# Patient Record
Sex: FEMALE | Race: OTHER | NOT HISPANIC OR LATINO | Employment: UNEMPLOYED | URBAN - METROPOLITAN AREA
[De-identification: names, ages, dates, MRNs, and addresses within clinical notes are randomized per-mention and may not be internally consistent; named-entity substitution may affect disease eponyms.]

---

## 2019-09-12 ENCOUNTER — APPOINTMENT (OUTPATIENT)
Dept: PHYSICAL THERAPY | Facility: CLINIC | Age: 64
End: 2019-09-12
Payer: COMMERCIAL

## 2019-09-26 ENCOUNTER — EVALUATION (OUTPATIENT)
Dept: PHYSICAL THERAPY | Facility: CLINIC | Age: 64
End: 2019-09-26
Payer: COMMERCIAL

## 2019-09-26 ENCOUNTER — TRANSCRIBE ORDERS (OUTPATIENT)
Dept: PHYSICAL THERAPY | Facility: CLINIC | Age: 64
End: 2019-09-26

## 2019-09-26 DIAGNOSIS — M17.12 PRIMARY OSTEOARTHRITIS OF LEFT KNEE: Primary | ICD-10-CM

## 2019-09-26 DIAGNOSIS — Z96.652 HISTORY OF TOTAL LEFT KNEE REPLACEMENT: ICD-10-CM

## 2019-09-26 DIAGNOSIS — M17.12 OSTEOARTHRITIS OF LEFT KNEE, UNSPECIFIED OSTEOARTHRITIS TYPE: Primary | ICD-10-CM

## 2019-09-26 PROCEDURE — 97110 THERAPEUTIC EXERCISES: CPT

## 2019-09-26 PROCEDURE — 97161 PT EVAL LOW COMPLEX 20 MIN: CPT

## 2019-09-26 RX ORDER — OXYCODONE HYDROCHLORIDE 15 MG/1
15 TABLET ORAL EVERY 4 HOURS PRN
COMMUNITY
End: 2021-09-22

## 2019-09-26 RX ORDER — TRAMADOL HYDROCHLORIDE 50 MG/1
50 TABLET ORAL EVERY 6 HOURS PRN
COMMUNITY
End: 2021-09-22

## 2019-09-26 RX ORDER — MELOXICAM 15 MG/1
15 TABLET ORAL DAILY
COMMUNITY
End: 2021-09-22

## 2019-09-26 NOTE — PROGRESS NOTES
PT Evaluation     Today's date: 2019  Patient name: Denise Buerger  : 1955  MRN: [id number]  Referring provider: Daisha Deras MD  Dx:   Encounter Diagnosis     ICD-10-CM    1  Primary osteoarthritis of left knee M17 12    2  History of total left knee replacement Z96 [id number]        Start Time: 832  Stop Time: 09  Total time in clinic (min): 30 minutes    Assessment  Assessment details: Denise Buerger is a 60 y o  female presenting to PT with pain, decreased knee range of motion, decreased LE strength, balance deficits, and decreased tolerance to activity s/p L TKA one month ago  Patient would benefit from skilled PT services to address these impairments and to maximize function in order to improve quality of life  Thank you for the referral   Impairments: abnormal gait, abnormal muscle tone, abnormal or restricted ROM, activity intolerance, impaired balance, impaired physical strength, lacks appropriate home exercise program, pain with function, safety issue and weight-bearing intolerance  Functional limitations: pain with ambulation and ascending/descending stairs, unable to participate in recreational activities due to pain, difficulty completing household chores and daily activities  Symptom irritability: moderateUnderstanding of Dx/Px/POC: good   Prognosis: good    Goals  STG  1) L knee ROM will improve to at least 100 degrees in 4 weeks  2) L knee strength will improve 1/2 grade in 4 weeks  3) B/L hip strength will improve 1/2 grade in 4 weeks  LTG  1) Patient is independent in HEP  2) Patient will ascend/descend one flight of stairs independently with no increased pain in 8 weeks  3) Patient will ambulate independently and safely with no AD for 200 feet on level surface in 8 weeks  4) L knee ROM will be at least 115 degrees in 8 weeks      Plan  Patient would benefit from: skilled physical therapy  Planned modality interventions: cryotherapy  Planned therapy interventions: joint mobilization, manual therapy, muscle pump exercises, neuromuscular re-education, patient education, strengthening, stretching, therapeutic activities, therapeutic exercise, home exercise program, gait training, flexibility, balance/weight bearing training, massage, abdominal trunk stabilization, body mechanics training, postural training and functional ROM exercises  Frequency: 2x week  Duration in weeks: 8  Treatment plan discussed with: patient        Subjective Evaluation    History of Present Illness  Date of surgery: 2019  Mechanism of injury: Patient presents 30 days after L TKA  She was having home PT until 6 days ago  She is ambulating with a SPC which she has been using for about 2 weeks, using a RW for 2 weeks before that  She tells me she was doing well until she got a cold last week, laid up in bed for a day and unable to do her exercises  She says since that day she feels like she took a step back with her recovery  She says her flexion range was up to 86 degrees "on a really good day "  Quality of life: good    Pain  Current pain rating: 3  At best pain ratin  At worst pain ratin  Quality: tight, dull ache and burning  Relieving factors: ice, change in position and medications  Aggravating factors: standing and walking    Treatments  Previous treatment: physical therapy  Current treatment: physical therapy  Discharged from (in last 30 days): inpatient hospitalization and home health care  Patient Goals  Patient goals for therapy: increased strength, return to sport/leisure activities, independence with ADLs/IADLs, increased motion, decreased pain, decreased edema and return to work  Patient goal: improve range of motion and get back to walking        Objective     Observations   Left Knee   Positive for adhesive scar, edema and incision  Negative for drainage  Tenderness   Left Knee   Tenderness in the inferior patella, lateral joint line and medial joint line       Neurological Testing     Sensation     Knee   Left Knee   Intact: light touch    Passive Range of Motion   Left Knee   Flexion: 80 degrees with pain  Extension: -8 degrees with pain    Mobility   Patellar Mobility:   Left Knee   Hypomobile: left medial, left lateral, left superior and left inferior    Strength/Myotome Testing     Left Hip   Planes of Motion   Flexion: 3+  Extension: 3+  Abduction: 3+  Adduction: 4-    Isolated Muscles   Gluteus coty: 3+  Gluteus medius: 3+    Left Knee   Flexion: 3+  Extension: 3  Quadriceps contraction: fair    Swelling     Left Knee Girth Measurement (cm)   Joint line: 46 5 cm    FOTO score: 35  Expected at discharge: 59          Precautions: none    Daily Treatment Diary     Manuals             L knee PROM flex/extension             Edema massage LLE (swelling)                                       Exercise Diary              Bike             Leg press flexion stretch                          Heel slides             SAQ             HS/calf strap stretch                          Glute bridge             SLR flexion                                       LAQ             TB HS curls                          Mini squats             Standing 3-way hip             Standing march                                                                                           Modalities             CP post prn

## 2019-09-26 NOTE — LETTER
2019    Ian Mills MD  [address] [place] [address]    Patient: Diane Andres   YOB: 1955   Date of Visit: 2019     Encounter Diagnosis     ICD-10-CM    1  Primary osteoarthritis of left knee M17 12    2  History of total left knee replacement [id number]        Dear Dr Shelly Whittington:    Thank you for your recent referral of Diane Andres  Please review the attached evaluation summary from Chana's recent visit  Please verify that you agree with the plan of care by signing the attached order  If you have any questions or concerns, please do not hesitate to call  I sincerely appreciate the opportunity to share in the care of one of your patients and hope to have another opportunity to work with you in the near future  Sincerely,    Romeo Barnhart, PT      Referring Provider:      I certify that I have read the below Plan of Care and certify the need for these services furnished under this plan of treatment while under my care  Ian Mills MD  [facility]: [phone]          PT Evaluation     Today's date: 2019  Patient name: Diane Andres  : 1955  MRN: [id number]  Referring provider: Radhames Ortiz MD  Dx:   Encounter Diagnosis     ICD-10-CM    1  Primary osteoarthritis of left knee M17 12    2  History of total left knee replacement Z96 [id number]        Start Time: 832  Stop Time: 902  Total time in clinic (min): 30 minutes    Assessment  Assessment details: Diane Andres is a 60 y o  female presenting to PT with pain, decreased knee range of motion, decreased LE strength, balance deficits, and decreased tolerance to activity s/p L TKA one month ago  Patient would benefit from skilled PT services to address these impairments and to maximize function in order to improve quality of life   Thank you for the referral   Impairments: abnormal gait, abnormal muscle tone, abnormal or restricted ROM, activity intolerance, impaired balance, impaired physical strength, lacks appropriate home exercise program, pain with function, safety issue and weight-bearing intolerance  Functional limitations: pain with ambulation and ascending/descending stairs, unable to participate in recreational activities due to pain, difficulty completing household chores and daily activities  Symptom irritability: moderateUnderstanding of Dx/Px/POC: good   Prognosis: good    Goals  STG  1) L knee ROM will improve to at least 100 degrees in 4 weeks  2) L knee strength will improve 1/2 grade in 4 weeks  3) B/L hip strength will improve 1/2 grade in 4 weeks  LTG  1) Patient is independent in HEP  2) Patient will ascend/descend one flight of stairs independently with no increased pain in 8 weeks  3) Patient will ambulate independently and safely with no AD for 200 feet on level surface in 8 weeks  4) L knee ROM will be at least 115 degrees in 8 weeks  Plan  Patient would benefit from: skilled physical therapy  Planned modality interventions: cryotherapy  Planned therapy interventions: joint mobilization, manual therapy, muscle pump exercises, neuromuscular re-education, patient education, strengthening, stretching, therapeutic activities, therapeutic exercise, home exercise program, gait training, flexibility, balance/weight bearing training, massage, abdominal trunk stabilization, body mechanics training, postural training and functional ROM exercises  Frequency: 2x week  Duration in weeks: 8  Treatment plan discussed with: patient        Subjective Evaluation    History of Present Illness  Date of surgery: 8/27/2019  Mechanism of injury: Patient presents 30 days after L TKA  She was having home PT until 6 days ago  She is ambulating with a SPC which she has been using for about 2 weeks, using a RW for 2 weeks before that    She tells me she was doing well until she got a cold last week, laid up in bed for a day and unable to do her exercises  She says since that day she feels like she took a step back with her recovery  She says her flexion range was up to 86 degrees "on a really good day "  Quality of life: good    Pain  Current pain rating: 3  At best pain ratin  At worst pain ratin  Quality: tight, dull ache and burning  Relieving factors: ice, change in position and medications  Aggravating factors: standing and walking    Treatments  Previous treatment: physical therapy  Current treatment: physical therapy  Discharged from (in last 30 days): inpatient hospitalization and home health care  Patient Goals  Patient goals for therapy: increased strength, return to sport/leisure activities, independence with ADLs/IADLs, increased motion, decreased pain, decreased edema and return to work  Patient goal: improve range of motion and get back to walking        Objective     Observations   Left Knee   Positive for adhesive scar, edema and incision  Negative for drainage  Tenderness   Left Knee   Tenderness in the inferior patella, lateral joint line and medial joint line       Neurological Testing     Sensation     Knee   Left Knee   Intact: light touch    Passive Range of Motion   Left Knee   Flexion: 80 degrees with pain  Extension: -8 degrees with pain    Mobility   Patellar Mobility:   Left Knee   Hypomobile: left medial, left lateral, left superior and left inferior    Strength/Myotome Testing     Left Hip   Planes of Motion   Flexion: 3+  Extension: 3+  Abduction: 3+  Adduction: 4-    Isolated Muscles   Gluteus coty: 3+  Gluteus medius: 3+    Left Knee   Flexion: 3+  Extension: 3  Quadriceps contraction: fair    Swelling     Left Knee Girth Measurement (cm)   Joint line: 46 5 cm    FOTO score: 35  Expected at discharge: 59          Precautions: none    Daily Treatment Diary     Manuals             L knee PROM flex/extension             Edema massage LLE (swelling) Exercise Diary              Bike             Leg press flexion stretch                          Heel slides             SAQ             HS/calf strap stretch                          Glute bridge             SLR flexion                                       LAQ             TB HS curls                          Mini squats             Standing 3-way hip             Standing march                                                                                           Modalities             CP post prn

## 2019-09-30 ENCOUNTER — OFFICE VISIT (OUTPATIENT)
Dept: PHYSICAL THERAPY | Facility: CLINIC | Age: 64
End: 2019-09-30
Payer: COMMERCIAL

## 2019-09-30 DIAGNOSIS — Z96.652 HISTORY OF TOTAL LEFT KNEE REPLACEMENT: ICD-10-CM

## 2019-09-30 DIAGNOSIS — M17.12 PRIMARY OSTEOARTHRITIS OF LEFT KNEE: Primary | ICD-10-CM

## 2019-09-30 PROCEDURE — 97140 MANUAL THERAPY 1/> REGIONS: CPT

## 2019-09-30 PROCEDURE — 97110 THERAPEUTIC EXERCISES: CPT

## 2019-09-30 PROCEDURE — 97112 NEUROMUSCULAR REEDUCATION: CPT

## 2019-09-30 NOTE — PROGRESS NOTES
Daily Note     Today's date: 2019  Patient name: Jose L Aviles  : 1955  MRN: [id number]  Referring provider: Emir Lopez MD  Dx:   Encounter Diagnosis     ICD-10-CM    1  Primary osteoarthritis of left knee M17 12    2  History of total left knee replacement [id number] [id number]        Start Time: 804  Stop Time: 0900  Total time in clinic (min): 56 minutes    Subjective: Patient tells me her knee feels tight this morning  She says when she woke up two days ago her knee felt "so great", describing improved range and no tightness, but as the day went on it felt like it was getting tighter  Objective: See treatment diary below  Assessment: Tolerated treatment well  Patient with significantly limited L knee flexion ROM, which affects her ability to perform some exercises (bridges in particular)  She is able to tolerate manual stretching into knee flexion, but does have increased discomfort with this  Patient demonstrated fatigue post treatment, exhibited good technique with therapeutic exercises and would benefit from continued PT      Plan: Continue per plan of care          Precautions: none    Daily Treatment Diary     Manuals             L knee PROM flex/extension AN            Edema massage LLE (swelling) AN                                      Exercise Diary              Bike 6 min timer            Leg press flexion stretch 10 sec  x10                         Heel slides 5 sec  2x10            SAQ 3 sec  2x10            HS/calf strap stretch 30 sec  x3                         Glute bridge NV            SLR flexion NV                                      LAQ NV            TB HS curls NV                         Mini squats 2x10            Standing 3-way hip 2x10 ea            Standing march 2x10                                                                                          Modalities             CP post prn 10 min

## 2019-10-03 ENCOUNTER — OFFICE VISIT (OUTPATIENT)
Dept: PHYSICAL THERAPY | Facility: CLINIC | Age: 64
End: 2019-10-03
Payer: COMMERCIAL

## 2019-10-03 DIAGNOSIS — Z96.652 HISTORY OF TOTAL LEFT KNEE REPLACEMENT: ICD-10-CM

## 2019-10-03 DIAGNOSIS — M17.12 PRIMARY OSTEOARTHRITIS OF LEFT KNEE: Primary | ICD-10-CM

## 2019-10-03 PROCEDURE — 97110 THERAPEUTIC EXERCISES: CPT

## 2019-10-03 PROCEDURE — 97140 MANUAL THERAPY 1/> REGIONS: CPT

## 2019-10-03 PROCEDURE — 97112 NEUROMUSCULAR REEDUCATION: CPT

## 2019-10-03 NOTE — PROGRESS NOTES
Daily Note     Today's date: 10/3/2019  Patient name: Sharda Lombardo  : 1955  MRN: [id number]  Referring provider: Nicol Bartholomew MD  Dx:   Encounter Diagnosis     ICD-10-CM    1  Primary osteoarthritis of left knee M17 12    2  History of total left knee replacement [id number] [id number]                   Subjective: patient noted that in the morning when she wakes up she feels great and then throughout the day tightness and increased swelling  Patient noted that right now she is taking Asprin and noted that the doctor said to stop Asprin on Oct 8 and then she can start taking anti inflammatories  Objective: See treatment diary below      Assessment: Tolerated treatment fair  Added  Ham curls with pink TB with no patient complains  Patient needed some VC to correct form to perform standing hip exercises with out compensation  Patient would benefit from continued PT  Plan: Continue per plan of care        Precautions: none    Daily Treatment Diary     Manuals  10/3           L knee PROM flex/extension AN af           Edema massage LLE (swelling) AN af                                     Exercise Diary              Bike 6 min timer 6 min timer           Leg press flexion stretch 10 sec  x10 10 secx10                        Heel slides 5 sec  2x10 5 sec  2x10           SAQ 3 sec  2x10 3 sec  2x10           HS/calf strap stretch 30 sec  x3 30 sec  x3                        Glute bridge NV            SLR flexion NV                                      LAQ NV            TB HS curls NV  pink TB 10x                        Mini squats 2x10 2x10           Standing 3-way hip 2x10 ea 2x10 ea           Standing march 2x10 2x10                                                                                         Modalities             CP post prn 10 min

## 2019-10-07 ENCOUNTER — OFFICE VISIT (OUTPATIENT)
Dept: PHYSICAL THERAPY | Facility: CLINIC | Age: 64
End: 2019-10-07
Payer: COMMERCIAL

## 2019-10-07 DIAGNOSIS — M17.12 PRIMARY OSTEOARTHRITIS OF LEFT KNEE: Primary | ICD-10-CM

## 2019-10-07 DIAGNOSIS — Z96.652 HISTORY OF TOTAL LEFT KNEE REPLACEMENT: ICD-10-CM

## 2019-10-07 PROCEDURE — 97140 MANUAL THERAPY 1/> REGIONS: CPT | Performed by: PHYSICAL THERAPIST

## 2019-10-07 PROCEDURE — 97110 THERAPEUTIC EXERCISES: CPT | Performed by: PHYSICAL THERAPIST

## 2019-10-07 PROCEDURE — 97112 NEUROMUSCULAR REEDUCATION: CPT | Performed by: PHYSICAL THERAPIST

## 2019-10-07 NOTE — PROGRESS NOTES
Daily Note     Today's date: 10/7/2019  Patient name: Darlin Speaker  : 1955  MRN: [id number]  Referring provider: Solange Hanson MD  Dx:   Encounter Diagnosis     ICD-10-CM    1  Primary osteoarthritis of left knee M17 12    2  History of total left knee replacement Z96 [id number]                 Pt 1 on 1 from 800 to 855    Subjective: States that yesterday had a lot of low back pain which has improved today, but still present  Her knee feels stiff today  Objective: See treatment diary below      Assessment: Tolerated session fair  Knee ROM improved following bike and munual stretching, but does have considerable pain and discomfort  Able to achieve 90 degrees of knee flexion today following ROM exercises  Patient would benefit from continued PT  Plan: Continue per plan of care        Precautions: none    Daily Treatment Diary     Manuals 9/30 10/3 10/7          L knee PROM flex/extension AN af DD          Edema massage LLE (swelling) AN af DD                                    Exercise Diary              Bike 6 min timer 6 min timer 6min          Leg press flexion stretch 10 sec  x10 10 secx10 10 secx10                       Heel slides 5 sec  2x10 5 sec  2x10 5 sec 2x10          SAQ 3 sec  2x10 3 sec  2x10 5 sec 2x10          HS/calf strap stretch 30 sec  x3 30 sec  x3 30 sec  x3                       Glute bridge NV            SLR flexion NV                                      LAQ NV            TB HS curls NV  pink TB 10x pink TB 10x2                       Mini squats 2x10 2x10 2x10          Standing 3-way hip 2x10 ea 2x10 ea 2x10 ea          Standing march 2x10 2x10 2x10                                                                                        Modalities             CP post prn 10 min

## 2019-10-10 ENCOUNTER — OFFICE VISIT (OUTPATIENT)
Dept: PHYSICAL THERAPY | Facility: CLINIC | Age: 64
End: 2019-10-10
Payer: COMMERCIAL

## 2019-10-10 DIAGNOSIS — Z96.652 HISTORY OF TOTAL LEFT KNEE REPLACEMENT: ICD-10-CM

## 2019-10-10 DIAGNOSIS — M17.12 PRIMARY OSTEOARTHRITIS OF LEFT KNEE: Primary | ICD-10-CM

## 2019-10-10 PROCEDURE — 97112 NEUROMUSCULAR REEDUCATION: CPT

## 2019-10-10 PROCEDURE — 97140 MANUAL THERAPY 1/> REGIONS: CPT

## 2019-10-10 PROCEDURE — 97110 THERAPEUTIC EXERCISES: CPT

## 2019-10-10 NOTE — PROGRESS NOTES
Daily Note     Today's date: 10/10/2019  Patient name: Sharda Lombardo  : 1955  MRN: [id number]  Referring provider: Nicol Bartholomew MD  Dx:   Encounter Diagnosis     ICD-10-CM    1  Primary osteoarthritis of left knee M17 12    2  History of total left knee replacement [id number] [id number]        Start Time: 1145  Stop Time: 0839  Total time in clinic (min): 61 minutes    Subjective: Patient states her knee feels stiff this morning, but says this is normally the case  Objective: See treatment diary below  Progressed volume of strengthening exercises today  Assessment: Tolerated treatment well  Good performance of progressed strengthening exercises, with no reported pain or discomfort throughout session  Patient would benefit from continued PT in an effort to improve range of motion and LE strength  Plan: Continue per plan of care        Precautions: none    Daily Treatment Diary     Manuals 9/30 10/3 10/7 10/10         L knee PROM flex/extension AN af DD AN         Edema massage LLE (swelling) AN af DD AN                                   Exercise Diary              Bike 6 min timer 6 min timer 6min 8 min  timer         Leg press flexion stretch 10 sec  x10 10 sec  x10 10 sec  x10 10 sec  x10                      Heel slides 5 sec  2x10 5 sec  2x10 5 sec 2x10 5 sec  2x10         SAQ 3 sec  2x10 3 sec  2x10 5 sec 2x10 HEP         HS/calf strap stretch 30 sec  x3 30 sec  x3 30 sec  x3 30 sec  x3                      Glute bridge NV   10x         SLR flexion NV   NP                                   LAQ NV            TB HS curls NV  pink TB 10x pink TB 10x2 PTB x10                      Mini squats 2x10 2x10 2x10 2x10         Standing 3-way hip 2x10 ea 2x10 ea 2x10 ea 2# 2x10         Standing march 2x10 2x10 2x10 2# 2x10                                                                                       Modalities             CP post prn 10 min   10 min

## 2019-10-14 ENCOUNTER — OFFICE VISIT (OUTPATIENT)
Dept: PHYSICAL THERAPY | Facility: CLINIC | Age: 64
End: 2019-10-14
Payer: COMMERCIAL

## 2019-10-14 DIAGNOSIS — M17.12 PRIMARY OSTEOARTHRITIS OF LEFT KNEE: Primary | ICD-10-CM

## 2019-10-14 DIAGNOSIS — Z96.652 HISTORY OF TOTAL LEFT KNEE REPLACEMENT: ICD-10-CM

## 2019-10-14 PROCEDURE — 97110 THERAPEUTIC EXERCISES: CPT

## 2019-10-14 PROCEDURE — 97140 MANUAL THERAPY 1/> REGIONS: CPT

## 2019-10-14 PROCEDURE — 97112 NEUROMUSCULAR REEDUCATION: CPT

## 2019-10-14 NOTE — PROGRESS NOTES
Daily Note     Today's date: 10/14/2019  Patient name: Jocelyn Fish  : 1955  MRN: [id number]  Referring provider: Nikita Shipman MD  Dx:   Encounter Diagnosis     ICD-10-CM    1  Primary osteoarthritis of left knee M17 12    2  History of total left knee replacement [id number] [id number]        Start Time: 805  Stop Time: 901  Total time in clinic (min): 56 minutes    Subjective: Patient reports stiffness in L knee this morning, stating it usually loosens up after doing PT exercises  Objective: See treatment diary below  Knee flexion measured at 90 degrees this visit  Assessment: Patient with fair tolerance to manual stretching today, reporting increased tightness in lateral and anterior knee at end of available range of motion  Good performance of strengthening program today, maintaining volume since previous visit  Patient would benefit from continued PT in an effort to improve range of motion and LE strength  Plan: Continue per plan of care        Precautions: none    Daily Treatment Diary     Manuals 9/30 10/3 10/7 10/10 10/14        L knee PROM flex/extension AN af DD AN AN        Edema massage LLE (swelling) AN af DD AN AN                                  Exercise Diary              Bike 6 min timer 6 min timer 6min 8 min  timer 8 min timer        Leg press flexion stretch 10 sec  x10 10 sec  x10 10 sec  x10 10 sec  x10 10 sec  x10                     Heel slides 5 sec  2x10 5 sec  2x10 5 sec 2x10 5 sec  2x10 5 sec  2x10        SAQ 3 sec  2x10 3 sec  2x10 5 sec 2x10 HEP         HS/calf strap stretch 30 sec  x3 30 sec  x3 30 sec  x3 30 sec  x3 30 sec  x3                     Glute bridge NV   10x 10x        SLR flexion NV   NP NP                                  LAQ NV            TB HS curls NV  pink TB 10x pink TB 10x2 PTB x10 PTB 2x10                     Mini squats 2x10 2x10 2x10 2x10 2x10        Standing 3-way hip 2x10 ea 2x10 ea 2x10 ea 2# 2x10 2# 2x10        Standing march 2x10 2x10 2x10 2# 2x10 2# 2x10                                                                                      Modalities             CP post prn 10 min   10 min 10 min

## 2019-10-17 ENCOUNTER — OFFICE VISIT (OUTPATIENT)
Dept: PHYSICAL THERAPY | Facility: CLINIC | Age: 64
End: 2019-10-17
Payer: COMMERCIAL

## 2019-10-17 DIAGNOSIS — M17.12 PRIMARY OSTEOARTHRITIS OF LEFT KNEE: Primary | ICD-10-CM

## 2019-10-17 DIAGNOSIS — Z96.652 HISTORY OF TOTAL LEFT KNEE REPLACEMENT: ICD-10-CM

## 2019-10-17 PROCEDURE — 97140 MANUAL THERAPY 1/> REGIONS: CPT

## 2019-10-17 PROCEDURE — 97112 NEUROMUSCULAR REEDUCATION: CPT

## 2019-10-17 PROCEDURE — 97110 THERAPEUTIC EXERCISES: CPT

## 2019-10-17 NOTE — PROGRESS NOTES
Daily Note     Today's date: 10/17/2019  Patient name: Alessandro Austin  : 1955  MRN: [id number]  Referring provider: Linda Steve MD  Dx:   Encounter Diagnosis     ICD-10-CM    1  Primary osteoarthritis of left knee M17 12    2  History of total left knee replacement [id number] [id number]        Start Time: 9780  Stop Time: 0842  Total time in clinic (min): 68 minutes    Subjective: Patient tells me she has her usual stiffness this morning, since she hasn't yet stretched or done any exercise  Objective: See treatment diary below  Knee flexion measured to 95 degrees this visit  Assessment: Patient with fair tolerance to manual stretching today, reporting increased tightness in lateral and anterior knee at end of available range of motion  Patient with good performance of all TE today  Patient would benefit from continued PT in an effort to improve range of motion and LE strength  Plan: Continue per plan of care           Precautions: none    Daily Treatment Diary     Manuals 9/30 10/3 10/7 10/10 10/14 10/17       L knee PROM flex/extension AN af DD AN AN AN       Edema massage LLE (swelling) AN af DD AN AN AN                                 Exercise Diary              Bike 6 min timer 6 min timer 6min 8 min  timer 8 min timer 10 min  timer       Leg press flexion stretch 10 sec  x10 10 sec  x10 10 sec  x10 10 sec  x10 10 sec  x10 10 sec  x10                    Heel slides 5 sec  2x10 5 sec  2x10 5 sec 2x10 5 sec  2x10 5 sec  2x10 5 sec  2x10       SAQ 3 sec  2x10 3 sec  2x10 5 sec 2x10 HEP         HS/calf strap stretch 30 sec  x3 30 sec  x3 30 sec  x3 30 sec  x3 30 sec  x3 30 sec  x3                    Glute bridge NV   10x 10x 2x10       SLR flexion NV   NP NP NV                                 LAQ NV     2x10       TB HS curls NV  pink TB 10x pink TB 10x2 PTB x10 PTB 2x10 PTB 2x10                    Mini squats 2x10 2x10 2x10 2x10 2x10 3x10       Standing 3-way hip 2x10 ea 2x10 ea 2x10 ea 2# 2x10 2# 2x10 2# 2x10       Standing march 2x10 2x10 2x10 2# 2x10 2# 2x10 2# 2x10                                                                                     Modalities             CP post prn 10 min   10 min 10 min 10 min

## 2019-10-21 ENCOUNTER — OFFICE VISIT (OUTPATIENT)
Dept: PHYSICAL THERAPY | Facility: CLINIC | Age: 64
End: 2019-10-21
Payer: COMMERCIAL

## 2019-10-21 DIAGNOSIS — M17.12 PRIMARY OSTEOARTHRITIS OF LEFT KNEE: Primary | ICD-10-CM

## 2019-10-21 DIAGNOSIS — Z96.652 HISTORY OF TOTAL LEFT KNEE REPLACEMENT: ICD-10-CM

## 2019-10-21 PROCEDURE — 97110 THERAPEUTIC EXERCISES: CPT

## 2019-10-21 PROCEDURE — 97140 MANUAL THERAPY 1/> REGIONS: CPT

## 2019-10-21 PROCEDURE — 97112 NEUROMUSCULAR REEDUCATION: CPT

## 2019-10-21 NOTE — PROGRESS NOTES
Daily Note     Today's date: 10/21/2019  Patient name: Gianluca Wilson  : 1955  MRN: [id number]  Referring provider: Nicolas Kiran MD  Dx:   Encounter Diagnosis     ICD-10-CM    1  Primary osteoarthritis of left knee M17 12    2  History of total left knee replacement Z96 652        Start Time: 08  Stop Time: 911  Total time in clinic (min): 64 minutes    Subjective: Patient with no new reports upon arrival today  Objective: See treatment diary below  Knee flexion measured at 96 degrees today  Assessment: Patient tolerates treatment fair today  Good tolerance to manual stretching, reporting her knee "loosens up" as session moves along  Patient would benefit from continued PT in an effort to improve range of motion and LE strength  Plan: Continue per plan of care           Precautions: none    Daily Treatment Diary     Manuals 9/30 10/3 10/7 10/10 10/14 10/17 10/21      L knee PROM flex/extension AN af DD AN AN AN AN      Edema massage LLE (swelling) AN af DD AN AN AN AN                                Exercise Diary              Bike 6 min timer 6 min timer 6min 8 min  timer 8 min timer 10 min  timer 10 min  timer      Leg press flexion stretch 10 sec  x10 10 sec  x10 10 sec  x10 10 sec  x10 10 sec  x10 10 sec  x10 10 sec  x10                   Heel slides 5 sec  2x10 5 sec  2x10 5 sec 2x10 5 sec  2x10 5 sec  2x10 5 sec  2x10 5 sec  2x10      SAQ 3 sec  2x10 3 sec  2x10 5 sec 2x10 HEP         HS/calf strap stretch 30 sec  x3 30 sec  x3 30 sec  x3 30 sec  x3 30 sec  x3 30 sec  x3 30 sec  x3                   Glute bridge NV   10x 10x 2x10 2x10      SLR flexion NV   NP NP NV 2x10                                LAQ NV     2x10  2x10      TB HS curls NV  pink TB 10x pink TB 10x2 PTB x10 PTB 2x10 PTB 2x10 PTB 2x15                   Mini squats 2x10 2x10 2x10 2x10 2x10 3x10 3x10      Standing 3-way hip 2x10 ea 2x10 ea 2x10 ea 2# 2x10 2# 2x10 2# 2x10 2# 2x15      Standing march 2x10 2x10 2x10 2# 2x10 2# 2x10 2# 2x10 2# 2x15                                                                                    Modalities             CP post prn 10 min   10 min 10 min 10 min 10 min

## 2019-10-24 ENCOUNTER — OFFICE VISIT (OUTPATIENT)
Dept: PHYSICAL THERAPY | Facility: CLINIC | Age: 64
End: 2019-10-24
Payer: COMMERCIAL

## 2019-10-24 DIAGNOSIS — Z96.652 HISTORY OF TOTAL LEFT KNEE REPLACEMENT: ICD-10-CM

## 2019-10-24 DIAGNOSIS — M17.12 PRIMARY OSTEOARTHRITIS OF LEFT KNEE: Primary | ICD-10-CM

## 2019-10-24 PROCEDURE — 97112 NEUROMUSCULAR REEDUCATION: CPT

## 2019-10-24 PROCEDURE — 97110 THERAPEUTIC EXERCISES: CPT

## 2019-10-24 PROCEDURE — 97140 MANUAL THERAPY 1/> REGIONS: CPT

## 2019-10-24 NOTE — PROGRESS NOTES
Daily Note     Today's date: 10/24/2019  Patient name: Courtney De La Vega  : 1955  MRN: [id number]  Referring provider: Jazz Bhatti MD  Dx:   Encounter Diagnosis     ICD-10-CM    1  Primary osteoarthritis of left knee M17 12    2  History of total left knee replacement Z96 652        Start Time: 736  Stop Time: 0847  Total time in clinic (min): 71 minutes    Subjective: Patient had a good report while at the doctor  She was told her flexion is much better to the point she does not need a manipulation at this time  She was also told to focus more on knee extension in addition to flexion moving forward, which has already been a part of her program       Objective: See treatment diary below  Assessment: Patient tolerates treatment well today  Good performance of strengthening activities, with no reports of pain or discomfort during session  Patient would benefit from continued PT in an effort to improve range of motion and LE strength  Plan: Continue per plan of care           Precautions: none    Daily Treatment Diary     Manuals 9/30 10/3 10/7 10/10 10/14 10/17 10/21 10/24     L knee PROM flex/extension AN af DD AN AN AN AN AN     Edema massage LLE (swelling) AN af DD AN AN AN AN AN                               Exercise Diary              Bike 6 min timer 6 min timer 6min 8 min  timer 8 min timer 10 min  timer 10 min  timer 10 min  timer     Leg press flexion stretch 10 sec  x10 10 sec  x10 10 sec  x10 10 sec  x10 10 sec  x10 10 sec  x10 10 sec  x10 10 sec  x10                  Heel slides 5 sec  2x10 5 sec  2x10 5 sec 2x10 5 sec  2x10 5 sec  2x10 5 sec  2x10 5 sec  2x10 5 sec  2x10     SAQ 3 sec  2x10 3 sec  2x10 5 sec 2x10 HEP    Prone quad stretch  30"x3     HS/calf strap stretch 30 sec  x3 30 sec  x3 30 sec  x3 30 sec  x3 30 sec  x3 30 sec  x3 30 sec  x3 30 sec  x3                  Glute bridge NV   10x 10x 2x10 2x10 2x10     SLR flexion NV   NP NP NV 2x10 2x10                               LAQ NV     2x10  2x10 2x10     TB HS curls NV  pink TB 10x pink TB 10x2 PTB x10 PTB 2x10 PTB 2x10 PTB 2x15 PTB  2x15                  Mini squats 2x10 2x10 2x10 2x10 2x10 3x10 3x10 2x15     Standing 3-way hip 2x10 ea 2x10 ea 2x10 ea 2# 2x10 2# 2x10 2# 2x10 2# 2x15 NV     Standing march 2x10 2x10 2x10 2# 2x10 2# 2x10 2# 2x10 2# 2x15 NV                                                                                   Modalities             CP post prn 10 min   10 min 10 min 10 min 10 min 10 min

## 2019-10-28 ENCOUNTER — APPOINTMENT (OUTPATIENT)
Dept: PHYSICAL THERAPY | Facility: CLINIC | Age: 64
End: 2019-10-28
Payer: COMMERCIAL

## 2019-10-29 ENCOUNTER — OFFICE VISIT (OUTPATIENT)
Dept: PHYSICAL THERAPY | Facility: CLINIC | Age: 64
End: 2019-10-29
Payer: COMMERCIAL

## 2019-10-29 DIAGNOSIS — Z96.652 HISTORY OF TOTAL LEFT KNEE REPLACEMENT: ICD-10-CM

## 2019-10-29 DIAGNOSIS — M17.12 PRIMARY OSTEOARTHRITIS OF LEFT KNEE: Primary | ICD-10-CM

## 2019-10-29 PROCEDURE — 97112 NEUROMUSCULAR REEDUCATION: CPT

## 2019-10-29 PROCEDURE — 97140 MANUAL THERAPY 1/> REGIONS: CPT

## 2019-10-29 PROCEDURE — 97110 THERAPEUTIC EXERCISES: CPT

## 2019-10-29 NOTE — PROGRESS NOTES
Daily Note     Today's date: 10/29/2019  Patient name: Miles Preciado  : 1955  MRN: [id number]  Referring provider: Susanne Campos MD  Dx:   Encounter Diagnosis     ICD-10-CM    1  Primary osteoarthritis of left knee M17 12    2  History of total left knee replacement Z96 652                   Subjective: Patient continues to feel stiffness in L knee  Did not take pain medication this morning prior to treatment, trying to ween  Used Advil this morning but feels this minimally helps control pain  Step downs remain difficult  Numbness also persists over anterior knee around incision  Objective: See treatment diary below  Assessment: L knee ext > R at rest  Able to stay relaxed during PROM flex with minimal guarding at available end ranges  Progressing appropriately with LE strengthening  Consider step downs when able to promote eccentric control  Plan: Continue per plan of care             Precautions: none    Daily Treatment Diary   Manuals 9/30 10/3 10/7 10/10 10/14 10/17 10/21 10/24 10/29    L knee PROM flex/extension AN af DD AN AN AN AN AN EH    Edema massage LLE (swelling) AN af DD AN AN AN AN AN EH                              Exercise Diary              Bike 6 min timer 6 min timer 6min 8 min  timer 8 min timer 10 min  timer 10 min  timer 10 min  timer 10 min timer    Leg press flexion stretch 10 sec  x10 10 sec  x10 10 sec  x10 10 sec  x10 10 sec  x10 10 sec  x10 10 sec  x10 10 sec  x10 10"x  10                 Heel slides 5 sec  2x10 5 sec  2x10 5 sec 2x10 5 sec  2x10 5 sec  2x10 5 sec  2x10 5 sec  2x10 5 sec  2x10 5" hold, 2x10    SAQ 3 sec  2x10 3 sec  2x10 5 sec 2x10 HEP    Prone quad stretch  30"x3 Prone quad stretch  30"x3    HS/calf strap stretch 30 sec  x3 30 sec  x3 30 sec  x3 30 sec  x3 30 sec  x3 30 sec  x3 30 sec  x3 30 sec  x3 30"x3                 Glute bridge NV   10x 10x 2x10 2x10 2x10 2x10    SLR flexion NV   NP NP NV 2x10 2x10 2x10                              LAQ NV 2x10  2x10 2x10 2x10    TB HS curls NV  pink TB 10x pink TB 10x2 PTB x10 PTB 2x10 PTB 2x10 PTB 2x15 PTB  2x15 Pink  2x15                 Mini squats 2x10 2x10 2x10 2x10 2x10 3x10 3x10 2x15 NV    Standing 3-way hip 2x10 ea 2x10 ea 2x10 ea 2# 2x10 2# 2x10 2# 2x10 2# 2x15 NV NV    Standing march 2x10 2x10 2x10 2# 2x10 2# 2x10 2# 2x10 2# 2x15 NV NV                                                                                  Modalities             CP post prn 10 min   10 min 10 min 10 min 10 min 10 min 10 min

## 2019-10-31 ENCOUNTER — OFFICE VISIT (OUTPATIENT)
Dept: PHYSICAL THERAPY | Facility: CLINIC | Age: 64
End: 2019-10-31
Payer: COMMERCIAL

## 2019-10-31 DIAGNOSIS — M17.12 PRIMARY OSTEOARTHRITIS OF LEFT KNEE: Primary | ICD-10-CM

## 2019-10-31 DIAGNOSIS — Z96.652 HISTORY OF TOTAL LEFT KNEE REPLACEMENT: ICD-10-CM

## 2019-10-31 PROCEDURE — 97110 THERAPEUTIC EXERCISES: CPT

## 2019-10-31 PROCEDURE — 97140 MANUAL THERAPY 1/> REGIONS: CPT

## 2019-10-31 PROCEDURE — 97112 NEUROMUSCULAR REEDUCATION: CPT

## 2019-10-31 NOTE — PROGRESS NOTES
Daily Note     Today's date: 10/31/2019  Patient name: Mindy Mcguire  : 1955  MRN: [id number]  Referring provider: Margaret Chairez MD  Dx:   Encounter Diagnosis     ICD-10-CM    1  Primary osteoarthritis of left knee M17 12    2  History of total left knee replacement Z96 652                   Subjective: Patient noted really feeling the effect of not taking pain medication  Continues to use Advil with minimal relief  Objective: See treatment diary below  Assessment: Ext is within functional limits  Gained some flex mobility with focus on flex based exercises  Fatigued with addition of weight for LAQs, indicating continued quad weakness  Swelling remains in L knee  Plan: Continue per plan of care             Precautions: none    Daily Treatment Diary   Manuals 10/31            L knee PROM flex/extension EH            Edema massage LLE (swelling) EH                                      Exercise Diary              Bike 10 min timer            Leg press flexion stretch 10"x 10                         Heel slides 5" hold, 2x10            SAQ HEP            HS/calf strap stretch 30"x3            Prone quad stretch 30"x3            Kneeling step stretch 8"  30"x3                         Glute bridge 2x10            SLR flexion 2x10                                      LAQ 2#  2x10            TB HS curls Pink  2x15                         Mini squats 2x10            Standing 3-way hip NV            Standing march NV                                                                                          Modalities             CP post prn 10 min

## 2019-11-04 ENCOUNTER — OFFICE VISIT (OUTPATIENT)
Dept: PHYSICAL THERAPY | Facility: CLINIC | Age: 64
End: 2019-11-04
Payer: COMMERCIAL

## 2019-11-04 DIAGNOSIS — M17.12 PRIMARY OSTEOARTHRITIS OF LEFT KNEE: Primary | ICD-10-CM

## 2019-11-04 DIAGNOSIS — Z96.652 HISTORY OF TOTAL LEFT KNEE REPLACEMENT: ICD-10-CM

## 2019-11-04 PROCEDURE — 97110 THERAPEUTIC EXERCISES: CPT

## 2019-11-04 PROCEDURE — 97140 MANUAL THERAPY 1/> REGIONS: CPT

## 2019-11-04 PROCEDURE — 97112 NEUROMUSCULAR REEDUCATION: CPT

## 2019-11-04 NOTE — PROGRESS NOTES
Daily Note     Today's date: 2019  Patient name: Wilman Royal  : 1955  MRN: [id number]  Referring provider: Soren Rascon MD  Dx:   Encounter Diagnosis     ICD-10-CM    1  Primary osteoarthritis of left knee M17 12    2  History of total left knee replacement Z96 652        Start Time: 08  Stop Time: 2476  Total time in clinic (min): 60 minutes    Subjective: Patient tells me she has stopped taking her pain medication because she is worried about building a tolerance to it, however she has noticed she is having much more pain since stopping it  She has just been taking an anti-inflammatory, but does not notice any improvement with this  Objective: See treatment diary below  Assessment: Swelling still remains in L knee, being addressed with edema massage and intermittent cold pack to knee throughout the day  Patient performs exercises with good tolerance, reporting tightness due to swelling at end range available ROM  Patient would benefit from continued skilled therapy in an effort to improve knee ROM, strength, and overall function  Plan: Continue per plan of care           Precautions: none    Daily Treatment Diary     Manuals 10/31 11/4           L knee PROM flex/extension EH AN           Edema massage LLE (swelling) EH AN                                     Exercise Diary              Bike 10 min timer 10 min  timer           Leg press flexion stretch 10"x 10 10" x10                        Heel slides 5" hold, 2x10 5 sec  2x10           SAQ HEP            HS/calf strap stretch 30"x3 HEP           Prone quad stretch 30"x3 30"x3           Kneeling step stretch 8"  30"x3 NV                        Glute bridge 2x10 2x15           SLR flexion 2x10 2x10                                     LAQ 2#  2x10 2# 2x15           TB HS curls Pink  2x15 PTB  2x15                        Mini squats 2x10 2x15           Standing 3-way hip NV 2# 2x10           Standing march NV 2# 2x10 Modalities             CP post prn 10 min  10 min

## 2019-11-07 ENCOUNTER — OFFICE VISIT (OUTPATIENT)
Dept: PHYSICAL THERAPY | Facility: CLINIC | Age: 64
End: 2019-11-07
Payer: COMMERCIAL

## 2019-11-07 DIAGNOSIS — Z96.652 HISTORY OF TOTAL LEFT KNEE REPLACEMENT: ICD-10-CM

## 2019-11-07 DIAGNOSIS — M17.12 PRIMARY OSTEOARTHRITIS OF LEFT KNEE: Primary | ICD-10-CM

## 2019-11-07 PROCEDURE — 97112 NEUROMUSCULAR REEDUCATION: CPT

## 2019-11-07 PROCEDURE — 97110 THERAPEUTIC EXERCISES: CPT

## 2019-11-07 PROCEDURE — 97140 MANUAL THERAPY 1/> REGIONS: CPT

## 2019-11-07 NOTE — PROGRESS NOTES
Daily Note     Today's date: 2019  Patient name: Aida Edward  : 1955  MRN: [id number]  Referring provider: Markel Lee MD  Dx:   Encounter Diagnosis     ICD-10-CM    1  Primary osteoarthritis of left knee M17 12    2  History of total left knee replacement Z96 652        Start Time: 07  Stop Time: 08  Total time in clinic (min): 50 minutes    Subjective: Patient says she is getting used to her "new normal" of going about her day without taking any pain medications  Objective: See treatment diary below  Assessment: Patient progresses slowly through exercises today, but is able to complete with no increase in symptoms during session  Patient would benefit from continued skilled therapy in an effort to improve knee ROM, strength, and overall function  Plan: Continue per plan of care           Precautions: none    Daily Treatment Diary     Manuals 10/31 11/4 11/7          L knee PROM flex/extension EH AN AN          Edema massage LLE (swelling) EH AN AN                                    Exercise Diary              Bike 10 min timer 10 min  timer 10 min timer          Leg press flexion stretch 10"x 10 10" x10 10" x10                       Heel slides 5" hold, 2x10 5 sec  2x10 5 sec 2x10          SAQ HEP            HS/calf strap stretch 30"x3 HEP           Prone quad stretch 30"x3 30"x3 30"x3          Kneeling step stretch 8"  30"x3 NV 8" 30"x3                       Glute bridge 2x10 2x15 2x15          SLR flexion 2x10 2x10 2x10                                    LAQ 2#  2x10 2# 2x15 NV          TB HS curls Pink  2x15 PTB  2x15 NV                       Mini squats 2x10 2x15 2x15          Standing 3-way hip NV 2# 2x10 2# 2x10          Standing march NV 2# 2x10 2# 2x10                                                                                        Modalities             CP post prn 10 min  10 min defers

## 2019-11-11 ENCOUNTER — OFFICE VISIT (OUTPATIENT)
Dept: PHYSICAL THERAPY | Facility: CLINIC | Age: 64
End: 2019-11-11
Payer: COMMERCIAL

## 2019-11-11 DIAGNOSIS — Z96.652 HISTORY OF TOTAL LEFT KNEE REPLACEMENT: ICD-10-CM

## 2019-11-11 DIAGNOSIS — M17.12 PRIMARY OSTEOARTHRITIS OF LEFT KNEE: Primary | ICD-10-CM

## 2019-11-11 PROCEDURE — 97112 NEUROMUSCULAR REEDUCATION: CPT

## 2019-11-11 PROCEDURE — 97140 MANUAL THERAPY 1/> REGIONS: CPT

## 2019-11-11 PROCEDURE — 97110 THERAPEUTIC EXERCISES: CPT

## 2019-11-11 NOTE — PROGRESS NOTES
Daily Note     Today's date: 2019  Patient name: Alexander Chavarria  : 1955  MRN: [id number]  Referring provider: Shahzad Carmona MD  Dx:   Encounter Diagnosis     ICD-10-CM    1  Primary osteoarthritis of left knee M17 12    2  History of total left knee replacement Z96 652        Start Time: 805  Stop Time: 905  Total time in clinic (min): 60 minutes    Subjective: Patient reports her knee is feeling more stiff this morning  She was on her feet often this past weekend, so feels her muscles are just sore and joint is tight when she awakes today  Objective: See treatment diary below  Assessment: Patient with good tolerance to exercises this visit, showing improved ability to achieve greater AAROM utilizing heel slides and prone quad stretching  Patient would benefit from continued skilled therapy in an effort to improve knee ROM, strength, and overall function  Plan: Continue per plan of care           Precautions: none    Daily Treatment Diary     Manuals 10/31 11/4 11/7 11/11         L knee PROM flex/extension EH AN AN AN         Edema massage LLE (swelling) EH AN AN AN                                   Exercise Diary              Bike 10 min timer 10 min  timer 10 min timer 10 min timer         Leg press flexion stretch 10"x 10 10" x10 10" x10 10" x10                      Heel slides 5" hold, 2x10 5 sec  2x10 5 sec 2x10 5 sec 2x10         SAQ HEP            HS/calf strap stretch 30"x3 HEP           Prone quad stretch 30"x3 30"x3 30"x3 30"x3         Kneeling step stretch 8"  30"x3 NV 8" 30"x3 NV                      Glute bridge 2x10 2x15 2x15 2x15         SLR flexion 2x10 2x10 2x10 2x15                                   LAQ 2#  2x10 2# 2x15 NV 2# 2x15         TB HS curls Pink  2x15 PTB  2x15 NV PTB 2x15                      Mini squats 2x10 2x15 2x15 2x15         Standing 3-way hip NV 2# 2x10 2# 2x10 2# 2x10         Standing march NV 2# 2x10 2# 2x10 2# 2x10 Modalities             CP post prn 10 min  10 min defers 10 min

## 2019-11-14 ENCOUNTER — OFFICE VISIT (OUTPATIENT)
Dept: PHYSICAL THERAPY | Facility: CLINIC | Age: 64
End: 2019-11-14
Payer: COMMERCIAL

## 2019-11-14 ENCOUNTER — TRANSCRIBE ORDERS (OUTPATIENT)
Dept: PHYSICAL THERAPY | Facility: CLINIC | Age: 64
End: 2019-11-14

## 2019-11-14 DIAGNOSIS — M17.12 OSTEOARTHRITIS OF LEFT KNEE, UNSPECIFIED OSTEOARTHRITIS TYPE: Primary | ICD-10-CM

## 2019-11-14 DIAGNOSIS — M17.12 PRIMARY OSTEOARTHRITIS OF LEFT KNEE: Primary | ICD-10-CM

## 2019-11-14 DIAGNOSIS — Z96.652 HISTORY OF TOTAL LEFT KNEE REPLACEMENT: ICD-10-CM

## 2019-11-14 PROCEDURE — 97140 MANUAL THERAPY 1/> REGIONS: CPT

## 2019-11-14 PROCEDURE — 97112 NEUROMUSCULAR REEDUCATION: CPT

## 2019-11-14 PROCEDURE — 97110 THERAPEUTIC EXERCISES: CPT

## 2019-11-14 NOTE — PROGRESS NOTES
PT Re-Evaluation     Today's date: 2019  Patient name: Jeffrey Prater  : 1955  MRN: [id number]  Referring provider: Aquiles Pedroza MD  Dx:   Encounter Diagnosis     ICD-10-CM    1  Primary osteoarthritis of left knee M17 12    2  History of total left knee replacement Z96 652        Start Time: 0740  Stop Time: 0852  Total time in clinic (min): 72 minutes    Assessment  Assessment details: Jeffrey Prater has been compliant with attending PT and HEP since initial eval  Chris Gilliam has made improvements in objective data since IE but is still limited compared to normal  Chris Gilliam continues with above listed impairments and would benefit from additional skilled PT to address these deficits to return to PLOF  Impairments: abnormal gait, abnormal or restricted ROM and impaired physical strength  Functional limitations: difficulty participating in recreational activities due to limited ROM, difficulty completing household chores and daily activities  Symptom irritability: lowUnderstanding of Dx/Px/POC: good   Prognosis: good    Goals  STG  1) L knee ROM will improve to at least 100 degrees in 4 weeks  -Met  2) L knee strength will improve 1/2 grade in 4 weeks  -Met  3) B/L hip strength will improve 1/2 grade in 4 weeks  -Met  LTG  1) Patient is independent in HEP  -Met  2) Patient will ascend/descend one flight of stairs independently with no increased pain in 8 weeks  -Met  3) Patient will ambulate independently and safely with no AD for 200 feet on level surface in 8 weeks   -Met  4) L knee ROM will be at least 115 degrees in 8 weeks  -Not met    Plan  Patient would benefit from: skilled physical therapy  Planned modality interventions: cryotherapy  Planned therapy interventions: joint mobilization, manual therapy, muscle pump exercises, neuromuscular re-education, patient education, strengthening, stretching, therapeutic activities, therapeutic exercise, home exercise program, gait training, flexibility, balance/weight bearing training, massage, abdominal trunk stabilization, body mechanics training, postural training and functional ROM exercises  Frequency: 2x week  Duration in weeks: 4  Treatment plan discussed with: patient        Subjective Evaluation    History of Present Illness  Date of surgery: 2019  Mechanism of injury: Patient tells me her knee is still feeling numb on the lateral side  She feels she is walking more smoothly and not having as much pain  She knows she can still improve more regarding her knee flexion ROM, and says she is working on this at home between PT sessions  Quality of life: good    Pain  Current pain ratin  At best pain ratin  At worst pain ratin  Quality: tight and dull ache  Relieving factors: ice, change in position and medications  Progression: improved    Treatments  Previous treatment: physical therapy  Current treatment: physical therapy  Patient Goals  Patient goals for therapy: increased strength, return to sport/leisure activities, independence with ADLs/IADLs, increased motion, decreased pain, decreased edema and return to work  Patient goal: improve range of motion and get back to walking        Objective     Observations   Left Knee   Positive for adhesive scar and edema  Negative for drainage  Tenderness   Left Knee   Tenderness in the lateral joint line and medial joint line       Neurological Testing     Sensation     Knee   Left Knee   Intact: light touch    Active Range of Motion   Left Knee   Flexion: 105 degrees   Extension: 0 degrees     Passive Range of Motion   Left Knee   Flexion: 107 degrees   Extension: 0 degrees     Strength/Myotome Testing     Left Hip   Planes of Motion   Flexion: 4+  Extension: 4+  Abduction: 4  Adduction: 5    Isolated Muscles   Gluteus coty: 4+  Gluteus medius: 4    Left Knee   Flexion: 4+  Extension: 4  Quadriceps contraction: good    Swelling     Left Knee Girth Measurement (cm)   Joint line: 44 5 cm Precautions: none    Daily Treatment Diary     Manuals 10/31 11/4 11/7 11/11 11/14        L knee PROM flex/extension EH AN AN AN AN        Edema massage LLE (swelling) EH AN AN AN AN                                  Exercise Diary              Bike 10 min timer 10 min  timer 10 min timer 10 min timer 10 min  timer        Leg press flexion stretch 10"x 10 10" x10 10" x10 10" x10 10" x10                     Heel slides 5" hold, 2x10 5 sec  2x10 5 sec 2x10 5 sec 2x10 5 sec 2x10        SAQ HEP            HS/calf strap stretch 30"x3 HEP           Prone quad stretch 30"x3 30"x3 30"x3 30"x3 30"x3        Kneeling step stretch 8"  30"x3 NV 8" 30"x3 NV 8" 30"x3                     Glute bridge 2x10 2x15 2x15 2x15 2x15   w add        SLR flexion 2x10 2x10 2x10 2x15 1# 2x15                                  LAQ 2#  2x10 2# 2x15 NV 2# 2x15 2# 2x15        TB HS curls Pink  2x15 PTB  2x15 NV PTB 2x15 PTB 2x15                     Mini squats 2x10 2x15 2x15 2x15 2x15        Standing 3-way hip NV 2# 2x10 2# 2x10 2# 2x10 2# 2x10        Standing march NV 2# 2x10 2# 2x10 2# 2x10 2# 2x10                                                                                      Modalities             CP post prn 10 min  10 min defers 10 min 10 min

## 2019-11-14 NOTE — LETTER
2019    MD Portillo [address] [place] [address]    Patient: Miles Preciado   YOB: 1955   Date of Visit: 2019     Encounter Diagnosis     ICD-10-CM    1  Primary osteoarthritis of left knee M17 12    2  History of total left knee replacement F17 [id number]        Dear Dr Haley Dunlap:    Thank you for your recent referral of Miles Preciado  Please review the attached evaluation summary from Chana's recent visit  Please verify that you agree with the plan of care by signing the attached order  If you have any questions or concerns, please do not hesitate to call  I sincerely appreciate the opportunity to share in the care of one of your patients and hope to have another opportunity to work with you in the near future  Sincerely,    Micheal Barnhart, PT      Referring Provider:      I certify that I have read the below Plan of Care and certify the need for these services furnished under this plan of treatment while under my care  Aguila Ontiveros MD  [facility]: [phone]          PT Re-Evaluation     Today's date: 2019  Patient name: Miles Preciado  : 1955  MRN: [id number]  Referring provider: Susanne Campos MD  Dx:   Encounter Diagnosis     ICD-10-CM    1  Primary osteoarthritis of left knee M17 12    2  History of total left knee replacement [id number] [id number]        Start Time: 0740  Stop Time: 0852  Total time in clinic (min): 72 minutes    Assessment  Assessment details: Miles Preciado has been compliant with attending PT and HEP since initial eval  Jessie Duque has made improvements in objective data since IE but is still limited compared to normal  Jessie Duque continues with above listed impairments and would benefit from additional skilled PT to address these deficits to return to OF    Impairments: abnormal gait, abnormal or restricted ROM and impaired physical strength  Functional limitations: difficulty participating in recreational activities due to limited ROM, difficulty completing household chores and daily activities  Symptom irritability: lowUnderstanding of Dx/Px/POC: good   Prognosis: good    Goals  STG  1) L knee ROM will improve to at least 100 degrees in 4 weeks  -Met  2) L knee strength will improve 1/2 grade in 4 weeks  -Met  3) B/L hip strength will improve 1/2 grade in 4 weeks  -Met  LTG  1) Patient is independent in HEP  -Met  2) Patient will ascend/descend one flight of stairs independently with no increased pain in 8 weeks  -Met  3) Patient will ambulate independently and safely with no AD for 200 feet on level surface in 8 weeks  -Met  4) L knee ROM will be at least 115 degrees in 8 weeks  -Not met    Plan  Patient would benefit from: skilled physical therapy  Planned modality interventions: cryotherapy  Planned therapy interventions: joint mobilization, manual therapy, muscle pump exercises, neuromuscular re-education, patient education, strengthening, stretching, therapeutic activities, therapeutic exercise, home exercise program, gait training, flexibility, balance/weight bearing training, massage, abdominal trunk stabilization, body mechanics training, postural training and functional ROM exercises  Frequency: 2x week  Duration in weeks: 4  Treatment plan discussed with: patient        Subjective Evaluation    History of Present Illness  Date of surgery: 2019  Mechanism of injury: Patient tells me her knee is still feeling numb on the lateral side  She feels she is walking more smoothly and not having as much pain  She knows she can still improve more regarding her knee flexion ROM, and says she is working on this at home between PT sessions    Quality of life: good    Pain  Current pain ratin  At best pain ratin  At worst pain ratin  Quality: tight and dull ache  Relieving factors: ice, change in position and medications  Progression: improved    Treatments  Previous treatment: physical therapy  Current treatment: physical therapy  Patient Goals  Patient goals for therapy: increased strength, return to sport/leisure activities, independence with ADLs/IADLs, increased motion, decreased pain, decreased edema and return to work  Patient goal: improve range of motion and get back to walking        Objective     Observations   Left Knee   Positive for adhesive scar and edema  Negative for drainage  Tenderness   Left Knee   Tenderness in the lateral joint line and medial joint line       Neurological Testing     Sensation     Knee   Left Knee   Intact: light touch    Active Range of Motion   Left Knee   Flexion: 105 degrees   Extension: 0 degrees     Passive Range of Motion   Left Knee   Flexion: 107 degrees   Extension: 0 degrees     Strength/Myotome Testing     Left Hip   Planes of Motion   Flexion: 4+  Extension: 4+  Abduction: 4  Adduction: 5    Isolated Muscles   Gluteus coty: 4+  Gluteus medius: 4    Left Knee   Flexion: 4+  Extension: 4  Quadriceps contraction: good    Swelling     Left Knee Girth Measurement (cm)   Joint line: 44 5 cm          Precautions: none    Daily Treatment Diary     Manuals 10/31 11/4 11/7 11/11 11/14        L knee PROM flex/extension EH AN AN AN AN        Edema massage LLE (swelling) EH AN AN AN AN                                  Exercise Diary              Bike 10 min timer 10 min  timer 10 min timer 10 min timer 10 min  timer        Leg press flexion stretch 10"x 10 10" x10 10" x10 10" x10 10" x10                     Heel slides 5" hold, 2x10 5 sec  2x10 5 sec 2x10 5 sec 2x10 5 sec 2x10        SAQ HEP            HS/calf strap stretch 30"x3 HEP           Prone quad stretch 30"x3 30"x3 30"x3 30"x3 30"x3        Kneeling step stretch 8"  30"x3 NV 8" 30"x3 NV 8" 30"x3                     Glute bridge 2x10 2x15 2x15 2x15 2x15   w add        SLR flexion 2x10 2x10 2x10 2x15 1# 2x15 LAQ 2#  2x10 2# 2x15 NV 2# 2x15 2# 2x15        TB HS curls Pink  2x15 PTB  2x15 NV PTB 2x15 PTB 2x15                     Mini squats 2x10 2x15 2x15 2x15 2x15        Standing 3-way hip NV 2# 2x10 2# 2x10 2# 2x10 2# 2x10        Standing march NV 2# 2x10 2# 2x10 2# 2x10 2# 2x10                                                                                      Modalities             CP post prn 10 min  10 min defers 10 min 10 min

## 2019-11-18 ENCOUNTER — OFFICE VISIT (OUTPATIENT)
Dept: PHYSICAL THERAPY | Facility: CLINIC | Age: 64
End: 2019-11-18
Payer: COMMERCIAL

## 2019-11-18 ENCOUNTER — APPOINTMENT (OUTPATIENT)
Dept: PHYSICAL THERAPY | Facility: CLINIC | Age: 64
End: 2019-11-18
Payer: COMMERCIAL

## 2019-11-18 DIAGNOSIS — M17.12 PRIMARY OSTEOARTHRITIS OF LEFT KNEE: Primary | ICD-10-CM

## 2019-11-18 DIAGNOSIS — Z96.652 HISTORY OF TOTAL LEFT KNEE REPLACEMENT: ICD-10-CM

## 2019-11-18 PROCEDURE — 97110 THERAPEUTIC EXERCISES: CPT

## 2019-11-18 PROCEDURE — 97112 NEUROMUSCULAR REEDUCATION: CPT

## 2019-11-18 PROCEDURE — 97140 MANUAL THERAPY 1/> REGIONS: CPT

## 2019-11-18 NOTE — PROGRESS NOTES
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  Forgot to take Advil this morning  Objective: See treatment diary below  Assessment: Continued focus on increasing functional knee flex and LE strength  Does at times seem to use momentum during standing strengthening exercises  Will benefit from continued focus on increasing knee flex mobility and strengthening in L LE  Plan: Continue per plan of care  Progress treatment as tolerated              Precautions: none    Daily Treatment Diary   Manuals 10/31 11/4 11/7 11/11 11/14 11/18       L knee PROM flex/extension EH AN AN AN AN EH       Edema massage LLE (swelling) EH AN AN AN AN NP                                 Exercise Diary              Bike 10 min timer 10 min  timer 10 min timer 10 min timer 10 min  timer 10 min timer       Leg press flexion stretch 10"x 10 10" x10 10" x10 10" x10 10" x10 10"x10                    Heel slides 5" hold, 2x10 5 sec  2x10 5 sec 2x10 5 sec 2x10 5 sec 2x10 5" hold, 2x10       SAQ HEP            HS/calf strap stretch 30"x3 HEP           Prone quad stretch 30"x3 30"x3 30"x3 30"x3 30"x3 30"x3       Kneeling step stretch 8"  30"x3 NV 8" 30"x3 NV 8" 30"x3 8"  30"x3                    Glute bridge 2x10 2x15 2x15 2x15 2x15   w add With add  2x15       SLR flexion 2x10 2x10 2x10 2x15 1# 2x15 1#  2x15                                 LAQ 2#  2x10 2# 2x15 NV 2# 2x15 2# 2x15 2#  2x15       TB HS curls Pink  2x15 PTB  2x15 NV PTB 2x15 PTB 2x15 Pink  2x15                    Mini squats 2x10 2x15 2x15 2x15 2x15 2x15       Standing 3-way hip NV 2# 2x10 2# 2x10 2# 2x10 2# 2x10 2#  2x10 ea       Standing march NV 2# 2x10 2# 2x10 2# 2x10 2# 2x10 2#  2x10                                                                                     Modalities             CP post prn 10 min  10 min defers 10 min 10 min deferred

## 2019-11-21 ENCOUNTER — OFFICE VISIT (OUTPATIENT)
Dept: PHYSICAL THERAPY | Facility: CLINIC | Age: 64
End: 2019-11-21
Payer: COMMERCIAL

## 2019-11-21 DIAGNOSIS — Z96.652 HISTORY OF TOTAL LEFT KNEE REPLACEMENT: ICD-10-CM

## 2019-11-21 DIAGNOSIS — M17.12 PRIMARY OSTEOARTHRITIS OF LEFT KNEE: Primary | ICD-10-CM

## 2019-11-21 PROCEDURE — 97140 MANUAL THERAPY 1/> REGIONS: CPT

## 2019-11-21 PROCEDURE — 97112 NEUROMUSCULAR REEDUCATION: CPT

## 2019-11-21 PROCEDURE — 97110 THERAPEUTIC EXERCISES: CPT

## 2019-11-21 NOTE — PROGRESS NOTES
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  Objective: See treatment diary below  Assessment: Flex plane improving to more functional degree  Continues to fatigue with strengthening program, indicating weakness in hip and knee  Some ext lag present toward the end of reps with SLR flex with increased weight today  Plan: Continue per plan of care  Progress treatment as tolerated              Precautions: none    Daily Treatment Diary   Manuals 10/31 11/4 11/7 11/11 11/14 11/18 11/21      L knee PROM flex/extension EH AN AN AN AN EH EH      Edema massage LLE (swelling) EH AN AN AN AN NP NP                                Exercise Diary              Bike 10 min timer 10 min  timer 10 min timer 10 min timer 10 min  timer 10 min timer 10 min  timer      Leg press flexion stretch 10"x 10 10" x10 10" x10 10" x10 10" x10 10"x10 10"x  10                   Heel slides 5" hold, 2x10 5 sec  2x10 5 sec 2x10 5 sec 2x10 5 sec 2x10 5" hold, 2x10 5" hold, 2x10      SAQ HEP            HS/calf strap stretch 30"x3 HEP           Prone quad stretch 30"x3 30"x3 30"x3 30"x3 30"x3 30"x3 30"x3      Kneeling step stretch 8"  30"x3 NV 8" 30"x3 NV 8" 30"x3 8"  30"x3 8"  30"x3                   Glute bridge 2x10 2x15 2x15 2x15 2x15   w add With add  2x15 With add  2x15      SLR flexion 2x10 2x10 2x10 2x15 1# 2x15 1#  2x15 2#  2x15                                LAQ 2#  2x10 2# 2x15 NV 2# 2x15 2# 2x15 2#  2x15 2#  2x15      TB HS curls Pink  2x15 PTB  2x15 NV PTB 2x15 PTB 2x15 Pink  2x15 Pink  2x15 Mini squats 2x10 2x15 2x15 2x15 2x15 2x15 2x15      Standing 3-way hip NV 2# 2x10 2# 2x10 2# 2x10 2# 2x10 2#  2x10 ea 2#  2x10 ea      Standing march NV 2# 2x10 2# 2x10 2# 2x10 2# 2x10 2#  2x10 2#  2x10                                                                                    Modalities             CP post prn 10 min  10 min defers 10 min 10 min deferred deferred

## 2019-11-25 ENCOUNTER — OFFICE VISIT (OUTPATIENT)
Dept: PHYSICAL THERAPY | Facility: CLINIC | Age: 64
End: 2019-11-25
Payer: COMMERCIAL

## 2019-11-25 DIAGNOSIS — M17.12 PRIMARY OSTEOARTHRITIS OF LEFT KNEE: Primary | ICD-10-CM

## 2019-11-25 DIAGNOSIS — Z96.652 HISTORY OF TOTAL LEFT KNEE REPLACEMENT: ICD-10-CM

## 2019-11-25 PROCEDURE — 97140 MANUAL THERAPY 1/> REGIONS: CPT

## 2019-11-25 PROCEDURE — 97110 THERAPEUTIC EXERCISES: CPT

## 2019-11-25 PROCEDURE — 97112 NEUROMUSCULAR REEDUCATION: CPT

## 2019-11-25 NOTE — PROGRESS NOTES
Daily Note     Today's date: 2019  Patient name: Jonathan Allen  : 1955  MRN: [id number]  Referring provider: Marito Prado MD  Dx:   Encounter Diagnosis     ICD-10-CM    1  Primary osteoarthritis of left knee M17 12    2  History of total left knee replacement [id number] [id number]        Start Time: 0805  Stop Time: 09  Total time in clinic (min): 69 minutes    Subjective: Patient tells me her knee has been getting a little better every day, still noticing tightness is worst in the mornings  Objective: See treatment diary below  Assessment: Patient with increased fatigue during standing strengthening program today likely due to progressing volume of repetitions  Patient remains with hip and quad weakness and would benefit from continued skilled therapy to improve function  Plan: Continue per plan of care  Progress treatment as tolerated              Precautions: none    Daily Treatment Diary   Manuals 10/31 11/4 11/7 11/11 11/14 11/18 11/21 11/25     L knee PROM flex/extension EH AN AN AN AN EH EH AN     Edema massage LLE (swelling) EH AN AN AN AN NP NP AN                               Exercise Diary              Bike 10 min timer 10 min  timer 10 min timer 10 min timer 10 min  timer 10 min timer 10 min  timer 10 min  timer     Leg press flexion stretch 10"x 10 10" x10 10" x10 10" x10 10" x10 10"x10 10"x  10 10" x10                  Heel slides 5" hold, 2x10 5 sec  2x10 5 sec 2x10 5 sec 2x10 5 sec 2x10 5" hold, 2x10 5" hold, 2x10 2x15     SAQ HEP            HS/calf strap stretch 30"x3 HEP           Prone quad stretch 30"x3 30"x3 30"x3 30"x3 30"x3 30"x3 30"x3 30"x3     Kneeling step stretch 8"  30"x3 NV 8" 30"x3 NV 8" 30"x3 8"  30"x3 8"  30"x3 8" 30"x3                  Glute bridge 2x10 2x15 2x15 2x15 2x15   w add With add  2x15 With add  2x15 With add 2x15     SLR flexion 2x10 2x10 2x10 2x15 1# 2x15 1#  2x15 2#  2x15 2# 2x15                               LAQ 2#  2x10 2# 2x15 NV 2# 2x15 2# 2x15 2#  2x15 2#  2x15 2# 2x15     TB HS curls Pink  2x15 PTB  2x15 NV PTB 2x15 PTB 2x15 Pink  2x15 Pink  2x15 GTB 2x10                  Mini squats 2x10 2x15 2x15 2x15 2x15 2x15 2x15 2x15     Standing 3-way hip NV 2# 2x10 2# 2x10 2# 2x10 2# 2x10 2#  2x10 ea 2#  2x10 ea 2# 2x15 ea     Standing march NV 2# 2x10 2# 2x10 2# 2x10 2# 2x10 2#  2x10 2#  2x10 2# 2x15                                                                                   Modalities             CP post prn 10 min  10 min defers 10 min 10 min deferred deferred 10 min

## 2019-12-03 ENCOUNTER — OFFICE VISIT (OUTPATIENT)
Dept: PHYSICAL THERAPY | Facility: CLINIC | Age: 64
End: 2019-12-03
Payer: COMMERCIAL

## 2019-12-03 DIAGNOSIS — Z96.652 HISTORY OF TOTAL LEFT KNEE REPLACEMENT: ICD-10-CM

## 2019-12-03 DIAGNOSIS — M17.12 PRIMARY OSTEOARTHRITIS OF LEFT KNEE: Primary | ICD-10-CM

## 2019-12-03 PROCEDURE — 97112 NEUROMUSCULAR REEDUCATION: CPT

## 2019-12-03 PROCEDURE — 97110 THERAPEUTIC EXERCISES: CPT

## 2019-12-03 PROCEDURE — 97140 MANUAL THERAPY 1/> REGIONS: CPT

## 2019-12-03 NOTE — PROGRESS NOTES
Daily Note     Today's date: 12/3/2019  Patient name: Sharda Lombardo  : 1955  MRN: [id number]  Referring provider: Nicol Bartholomew MD  Dx:   Encounter Diagnosis     ICD-10-CM    1  Primary osteoarthritis of left knee M17 12    2  History of total left knee replacement Z96 652                   Subjective: Patient reported L knee feeling stiff this morning and has been feeling sore over the last few days  Objective: See treatment diary below  Assessment: PROM focused on flex deficits with ext presenting with full motion  Progressed hip strengthening to Multihip with good tolerance, noticeable weakness and fatigue on L compared to R LE  Plan: Continue per plan of care  Progress treatment as tolerated              Precautions: none    Daily Treatment Diary   Manuals 10/31 11/4 11/7 11/11 11/14 11/18 11/21 11/25 12/3    L knee PROM flex/extension EH AN AN AN AN EH EH AN EH    Edema massage LLE (swelling) EH AN AN AN AN NP NP AN EH                              Exercise Diary              Bike 10 min timer 10 min  timer 10 min timer 10 min timer 10 min  timer 10 min timer 10 min  timer 10 min  timer 10 min  timer    Leg press flexion stretch 10"x 10 10" x10 10" x10 10" x10 10" x10 10"x10 10"x  10 10" x10 10"x 10                 Heel slides 5" hold, 2x10 5 sec  2x10 5 sec 2x10 5 sec 2x10 5 sec 2x10 5" hold, 2x10 5" hold, 2x10 2x15 10"x 10    SAQ HEP            HS/calf strap stretch 30"x3 HEP           Prone quad stretch 30"x3 30"x3 30"x3 30"x3 30"x3 30"x3 30"x3 30"x3 30"x3    Kneeling step stretch 8"  30"x3 NV 8" 30"x3 NV 8" 30"x3 8"  30"x3 8"  30"x3 8" 30"x3 NV                 Glute bridge 2x10 2x15 2x15 2x15 2x15   w add With add  2x15 With add  2x15 With add 2x15 With add  2x15    SLR flexion 2x10 2x10 2x10 2x15 1# 2x15 1#  2x15 2#  2x15 2# 2x15 2#  2x15                              LAQ 2#  2x10 2# 2x15 NV 2# 2x15 2# 2x15 2#  2x15 2#  2x15 2# 2x15 2#  2x15    TB HS curls Pink  2x15 PTB  2x15 NV PTB 2x15 PTB 2x15 Pink  2x15 Pink  2x15 GTB 2x10 Green  2x10                 Mini squats 2x10 2x15 2x15 2x15 2x15 2x15 2x15 2x15 2x15    Standing 3-way hip NV 2# 2x10 2# 2x10 2# 2x10 2# 2x10 2#  2x10 ea 2#  2x10 ea 2# 2x15 ea Multihip    Standing march NV 2# 2x10 2# 2x10 2# 2x10 2# 2x10 2#  2x10 2#  2x10 2# 2x15 Multihip    Multihip abd         25#  2x10  bilat    Multihip flex         25#  2x10  bilat                                                        Modalities             CP post prn 10 min  10 min defers 10 min 10 min deferred deferred 10 min def

## 2019-12-05 ENCOUNTER — OFFICE VISIT (OUTPATIENT)
Dept: PHYSICAL THERAPY | Facility: CLINIC | Age: 64
End: 2019-12-05
Payer: COMMERCIAL

## 2019-12-05 DIAGNOSIS — Z96.652 HISTORY OF TOTAL LEFT KNEE REPLACEMENT: ICD-10-CM

## 2019-12-05 DIAGNOSIS — M17.12 PRIMARY OSTEOARTHRITIS OF LEFT KNEE: Primary | ICD-10-CM

## 2019-12-05 PROCEDURE — 97110 THERAPEUTIC EXERCISES: CPT

## 2019-12-05 PROCEDURE — 97112 NEUROMUSCULAR REEDUCATION: CPT

## 2019-12-05 PROCEDURE — 97140 MANUAL THERAPY 1/> REGIONS: CPT

## 2019-12-05 NOTE — PROGRESS NOTES
Daily Note     Today's date: 2019  Patient name: Chacorta Dotson  : 1955  MRN: [id number]  Referring provider: Tyhsawn Gay MD  Dx:   Encounter Diagnosis     ICD-10-CM    1  Primary osteoarthritis of left knee M17 12    2  History of total left knee replacement Z96 652                   Subjective: Patient reported some continued stiffness in L knee  Objective: See treatment diary below  Assessment: Has gained more functional knee flex on L, some discomfort at end range PROM  Fatigues with current strengthening program demonstrating unresolved hip and knee weakness  Plan: Continue per plan of care  Progress treatment as tolerated              Precautions: none    Daily Treatment Diary   Manuals 10/31 11/4 11/7 11/11 11/14 11/18 11/21 11/25 12/3 12/5   L knee PROM flex/extension EH AN AN AN AN EH EH AN EH EH   Edema massage LLE (swelling) EH AN AN AN AN NP NP AN EH EH                             Exercise Diary              Bike 10 min timer 10 min  timer 10 min timer 10 min timer 10 min  timer 10 min timer 10 min  timer 10 min  timer 10 min  timer 10 min timer   Leg press flexion stretch 10"x 10 10" x10 10" x10 10" x10 10" x10 10"x10 10"x  10 10" x10 10"x 10 10"x10                Heel slides 5" hold, 2x10 5 sec  2x10 5 sec 2x10 5 sec 2x10 5 sec 2x10 5" hold, 2x10 5" hold, 2x10 2x15 10"x 10 10"x10   SAQ HEP            HS/calf strap stretch 30"x3 HEP           Prone quad stretch 30"x3 30"x3 30"x3 30"x3 30"x3 30"x3 30"x3 30"x3 30"x3 30"x3   Kneeling step stretch 8"  30"x3 NV 8" 30"x3 NV 8" 30"x3 8"  30"x3 8"  30"x3 8" 30"x3 NV NV                Glute bridge 2x10 2x15 2x15 2x15 2x15   w add With add  2x15 With add  2x15 With add 2x15 With add  2x15 With add  2x15   SLR flexion 2x10 2x10 2x10 2x15 1# 2x15 1#  2x15 2#  2x15 2# 2x15 2#  2x15 2#  2x15                             LAQ 2#  2x10 2# 2x15 NV 2# 2x15 2# 2x15 2#  2x15 2#  2x15 2# 2x15 2#  2x15 2#  2x15   TB HS curls Pink  2x15 PTB  2x15 NV PTB 2x15 PTB 2x15 Pink  2x15 Pink  2x15 GTB 2x10 Green  2x10 Green  2x10                Mini squats 2x10 2x15 2x15 2x15 2x15 2x15 2x15 2x15 2x15 2x15   Standing 3-way hip NV 2# 2x10 2# 2x10 2# 2x10 2# 2x10 2#  2x10 ea 2#  2x10 ea 2# 2x15 ea Multihip    Standing march NV 2# 2x10 2# 2x10 2# 2x10 2# 2x10 2#  2x10 2#  2x10 2# 2x15 Multihip    Multihip abd         25#  2x10  bilat 25#  2x10  bilat   Multihip flex         25#  2x10  bilat 25#  2x10  bilat                                                       Modalities             CP post prn 10 min  10 min defers 10 min 10 min deferred deferred 10 min def def

## 2019-12-09 ENCOUNTER — OFFICE VISIT (OUTPATIENT)
Dept: PHYSICAL THERAPY | Facility: CLINIC | Age: 64
End: 2019-12-09
Payer: COMMERCIAL

## 2019-12-09 DIAGNOSIS — Z96.652 HISTORY OF TOTAL LEFT KNEE REPLACEMENT: ICD-10-CM

## 2019-12-09 DIAGNOSIS — M17.12 PRIMARY OSTEOARTHRITIS OF LEFT KNEE: Primary | ICD-10-CM

## 2019-12-09 PROCEDURE — 97110 THERAPEUTIC EXERCISES: CPT

## 2019-12-09 PROCEDURE — 97112 NEUROMUSCULAR REEDUCATION: CPT

## 2019-12-09 PROCEDURE — 97140 MANUAL THERAPY 1/> REGIONS: CPT

## 2019-12-09 NOTE — PROGRESS NOTES
Daily Note     Today's date: 2019  Patient name: Tia Chavez  : 1955  MRN: [id number]  Referring provider: Sumaya Cardenas MD  Dx:   Encounter Diagnosis     ICD-10-CM    1  Primary osteoarthritis of left knee M17 12    2  History of total left knee replacement Z96 [id number]        Start Time: 0804  Stop Time: 0900  Total time in clinic (min): 56 minutes    Subjective: Patient reports increased stiffness in knee this morning, stating this continues to be her biggest problem  Objective: See treatment diary below  Assessment: Patient with increased fatigue today, requiring multiple rest breaks throughout session  She denies any pain in knee by end of session, and defers CP as she will do this at home "if I need to "      Plan: Continue per plan of care  Progress treatment as tolerated              Precautions: none    Daily Treatment Diary     Manuals             L knee PROM flex/extension AN                                      Exercise Diary              Bike 10 min            Leg press flexion stretch 10 sec  x10                                      Prone quad stretch 30"x3            Glute bridge With add 2x15            SLR flexion 2# 2x15                         LAQ 2# 2x15            TB HS curls GTB 2x10                         Leg press NV            Multihip abd B/L 25# 2x10            Multihip flex B/L 25# 2x10                                                                Modalities             CP post prn home

## 2019-12-12 ENCOUNTER — OFFICE VISIT (OUTPATIENT)
Dept: PHYSICAL THERAPY | Facility: CLINIC | Age: 64
End: 2019-12-12
Payer: COMMERCIAL

## 2019-12-12 DIAGNOSIS — M17.12 PRIMARY OSTEOARTHRITIS OF LEFT KNEE: Primary | ICD-10-CM

## 2019-12-12 DIAGNOSIS — Z96.652 HISTORY OF TOTAL LEFT KNEE REPLACEMENT: ICD-10-CM

## 2019-12-12 PROCEDURE — 97110 THERAPEUTIC EXERCISES: CPT

## 2019-12-12 PROCEDURE — 97112 NEUROMUSCULAR REEDUCATION: CPT

## 2019-12-12 PROCEDURE — 97140 MANUAL THERAPY 1/> REGIONS: CPT

## 2019-12-12 NOTE — PROGRESS NOTES
PT Re-Evaluation     Today's date: 2019  Patient name: Ryne Naqvi  : 1955  MRN: [id number]  Referring provider: Sonia Olivera MD  Dx:   Encounter Diagnosis     ICD-10-CM    1  Primary osteoarthritis of left knee M17 12    2  History of total left knee replacement Z96 652        Start Time: 1643  Stop Time: 0833  Total time in clinic (min): 60 minutes    Assessment  Assessment details: Ryne Naqvi has been compliant with attending PT and HEP since initial eval  Raquel Noun has made improvements in objective data since IE but is still limited compared to normal  Raquel Noun continues with above listed impairments and would benefit from additional skilled PT to address these deficits to return to PLOF  Impairments: abnormal or restricted ROM and impaired physical strength  Functional limitations: difficulty participating in recreational activities due to limited ROM, difficulty completing household chores and daily activities  Symptom irritability: lowUnderstanding of Dx/Px/POC: good   Prognosis: good    Goals  STG  1) L knee ROM will improve to at least 100 degrees in 4 weeks  -Met  2) L knee strength will improve 1/2 grade in 4 weeks  -Met  3) B/L hip strength will improve 1/2 grade in 4 weeks  -Met  LTG  1) Patient is independent in HEP  -Met  2) Patient will ascend/descend one flight of stairs independently with no increased pain in 8 weeks  -Met  3) Patient will ambulate independently and safely with no AD for 200 feet on level surface in 8 weeks  -Met  4) L knee ROM will be at least 115 degrees in 8 weeks   -Met    Plan  Patient would benefit from: skilled physical therapy  Planned modality interventions: cryotherapy  Planned therapy interventions: joint mobilization, manual therapy, muscle pump exercises, neuromuscular re-education, patient education, strengthening, stretching, therapeutic activities, therapeutic exercise, home exercise program, gait training, flexibility, balance/weight bearing training, massage, abdominal trunk stabilization, body mechanics training, postural training and functional ROM exercises  Frequency: 2x week  Duration in weeks: 2  Treatment plan discussed with: patient        Subjective Evaluation    History of Present Illness  Date of surgery: 2019  Mechanism of injury: Patient feels her knee continues to improve every week  She is ambulating with normal mechanics, with ROM WFL  She says she still notices the most discomfort in the lateral part of the knee, more stiff in the morning, but improves as the day goes on  Quality of life: good    Pain  Current pain ratin  At best pain ratin  At worst pain rating: 3  Quality: tight and dull ache  Relieving factors: ice and change in position  Progression: improved    Treatments  Previous treatment: physical therapy  Current treatment: physical therapy  Patient Goals  Patient goals for therapy: increased strength, return to sport/leisure activities, independence with ADLs/IADLs, increased motion, decreased pain, decreased edema and return to work  Patient goal: improve range of motion and get back to walking        Objective     Observations   Left Knee   Positive for adhesive scar  Negative for drainage and edema  Tenderness   Left Knee   Tenderness in the lateral joint line  No tenderness in the medial joint line       Neurological Testing     Sensation     Knee   Left Knee   Intact: light touch    Active Range of Motion   Left Knee   Flexion: 115 degrees   Extension: 0 degrees     Passive Range of Motion   Left Knee   Flexion: 120 degrees   Extension: 0 degrees     Strength/Myotome Testing     Left Hip   Planes of Motion   Flexion: 5  Extension: 5  Abduction: 4+  Adduction: 5    Isolated Muscles   Gluteus coty: 5  Gluteus medius: 4+    Left Knee   Flexion: 5  Extension: 4+  Quadriceps contraction: good          Precautions: none    Daily Treatment Diary     Manuals            L knee PROM flex/extension AN AN                                     Exercise Diary              Bike 10 min 10 min           Leg press flexion stretch 10 sec  x10 10 sec  x10                                     Prone quad stretch 30"x3 30"x3           Glute bridge With add 2x15 With add 2x15           SLR flexion 2# 2x15 2# 2x15                        LAQ 2# 2x15 2# 2x15           TB HS curls GTB 2x10 GTB 2x10                        Leg press NV NV           Multihip abd B/L 25# 2x10 25# 2x10           Multihip flex B/L 25# 2x10 25# 2x10                                                               Modalities             CP post prn home 10 min

## 2019-12-16 ENCOUNTER — OFFICE VISIT (OUTPATIENT)
Dept: PHYSICAL THERAPY | Facility: CLINIC | Age: 64
End: 2019-12-16
Payer: COMMERCIAL

## 2019-12-16 DIAGNOSIS — Z96.652 HISTORY OF TOTAL LEFT KNEE REPLACEMENT: ICD-10-CM

## 2019-12-16 DIAGNOSIS — M17.12 PRIMARY OSTEOARTHRITIS OF LEFT KNEE: Primary | ICD-10-CM

## 2019-12-16 PROCEDURE — 97112 NEUROMUSCULAR REEDUCATION: CPT

## 2019-12-16 PROCEDURE — 97140 MANUAL THERAPY 1/> REGIONS: CPT

## 2019-12-16 PROCEDURE — 97110 THERAPEUTIC EXERCISES: CPT

## 2019-12-16 NOTE — PROGRESS NOTES
Daily Note     Today's date: 2019  Patient name: Michele Roth  : 1955  MRN: [id number]  Referring provider: Arabella Arthur MD  Dx:   Encounter Diagnosis     ICD-10-CM    1  Primary osteoarthritis of left knee M17 12    2  History of total left knee replacement Z96 652        Start Time: 08  Stop Time: 09  Total time in clinic (min): 62 minutes    Subjective: Patient with no reports of pain this morning  Objective: See treatment diary below  Assessment: Tolerated treatment well  Patient with muscle fatigue in LLE with completion of strengthening exercises, but able to complete increased volume and resistance of activities  Patient demonstrated fatigue post treatment, exhibited good technique with therapeutic exercises and would benefit from continued PT      Plan: Continue per plan of care  She has follow up with MD on          Precautions: none    Daily Treatment Diary     Manuals           L knee PROM flex/extension AN AN AN                                    Exercise Diary              Bike 10 min 10 min 10 min          Leg press flexion stretch 10 sec  x10 10 sec  x10 10 sec  x10                                    Prone quad stretch 30"x3 30"x3 30"x3          Glute bridge With add 2x15 With add 2x15 With add 2x15          SLR flexion 2# 2x15 2# 2x15 2# 2x15                       LAQ 2# 2x15 2# 2x15 2# 2x15          TB HS curls GTB 2x10 GTB 2x10 GTB 2x10                       Leg press S/L -seat 7 NV NV 45# 2x10          Multihip abd B/L 25# 2x10 25# 2x10 25# 3x10          Multihip flex B/L 25# 2x10 25# 2x10 25# 3x10                                                              Modalities             CP post prn home 10 min defers

## 2019-12-19 ENCOUNTER — OFFICE VISIT (OUTPATIENT)
Dept: PHYSICAL THERAPY | Facility: CLINIC | Age: 64
End: 2019-12-19
Payer: COMMERCIAL

## 2019-12-19 DIAGNOSIS — M17.12 PRIMARY OSTEOARTHRITIS OF LEFT KNEE: Primary | ICD-10-CM

## 2019-12-19 DIAGNOSIS — Z96.652 HISTORY OF TOTAL LEFT KNEE REPLACEMENT: ICD-10-CM

## 2019-12-19 PROCEDURE — 97140 MANUAL THERAPY 1/> REGIONS: CPT

## 2019-12-19 PROCEDURE — 97110 THERAPEUTIC EXERCISES: CPT

## 2019-12-19 PROCEDURE — 97112 NEUROMUSCULAR REEDUCATION: CPT

## 2019-12-19 NOTE — PROGRESS NOTES
Daily Note     Today's date: 2019  Patient name: Fermin Ordoñez  : 1955  MRN: [id number]  Referring provider: Jakub Rodas MD  Dx:   Encounter Diagnosis     ICD-10-CM    1  Primary osteoarthritis of left knee M17 12    2  History of total left knee replacement Z96 652                   Subjective: Patient continues to have occasional "nerve" pain that can be very sudden but brief  Objective: See treatment diary below  Assessment: Continued focus on increasing strength and ROM in L knee  Fatigues on Multihip related to hip weakness as well  PROM addressed flex deficits while working some on ext to maintain full range  Plan: Continue per plan of care  She has follow up with MD on            Precautions: none    Daily Treatment Diary   Manuals          L knee PROM flex/extension AN AN AN [facility]                                   Exercise Diary              Bike 10 min 10 min 10 min 10 min         Leg press flexion stretch 10 sec  x10 10 sec  x10 10 sec  x10 10"x 10                                   Prone quad stretch 30"x3 30"x3 30"x3 30"x3         Glute bridge With add 2x15 With add 2x15 With add 2x15 With add  2x15         SLR flexion 2# 2x15 2# 2x15 2# 2x15 2# 2x15                      LAQ 2# 2x15 2# 2x15 2# 2x15 2#  2x15         TB HS curls GTB 2x10 GTB 2x10 GTB 2x10 Green  2x10                      Leg press S/L -seat 7 NV NV 45# 2x10 45#  2x10         Multihip abd B/L 25# 2x10 25# 2x10 25# 3x10 25#  3x10         Multihip flex B/L 25# 2x10 25# 2x10 25# 3x10 25#  3x10                                                             Modalities             CP post prn home 10 min defers def

## 2019-12-23 ENCOUNTER — TRANSCRIBE ORDERS (OUTPATIENT)
Dept: PHYSICAL THERAPY | Facility: CLINIC | Age: 64
End: 2019-12-23

## 2019-12-23 ENCOUNTER — OFFICE VISIT (OUTPATIENT)
Dept: PHYSICAL THERAPY | Facility: CLINIC | Age: 64
End: 2019-12-23
Payer: COMMERCIAL

## 2019-12-23 DIAGNOSIS — Z96.652 HISTORY OF TOTAL LEFT KNEE REPLACEMENT: ICD-10-CM

## 2019-12-23 DIAGNOSIS — M17.12 PRIMARY OSTEOARTHRITIS OF LEFT KNEE: Primary | ICD-10-CM

## 2019-12-23 PROCEDURE — 97140 MANUAL THERAPY 1/> REGIONS: CPT

## 2019-12-23 PROCEDURE — 97110 THERAPEUTIC EXERCISES: CPT

## 2019-12-23 PROCEDURE — 97112 NEUROMUSCULAR REEDUCATION: CPT

## 2019-12-23 NOTE — LETTER
2019    Jo Ann Garcia MD  [address] [place] [address]    Patient: Fermin Ordoñez   YOB: 1955   Date of Visit: 2019     Encounter Diagnosis     ICD-10-CM    1  Primary osteoarthritis of left knee M17 12    2  History of total left knee replacement [id number]        Dear Dr Abena Richardson:    Thank you for your recent referral of Fermin Ordoñez  Please review the attached evaluation summary from Chana's recent visit  Please verify that you agree with the plan of care by signing the attached order  If you have any questions or concerns, please do not hesitate to call  I sincerely appreciate the opportunity to share in the care of one of your patients and hope to have another opportunity to work with you in the near future  Sincerely,    Leandro Barnhart, PT      Referring Provider:      I certify that I have read the below Plan of Care and certify the need for these services furnished under this plan of treatment while under my care  Jo Ann Garcia MD  [facility]: [phone]          PT Re-Evaluation     Today's date: 2019  Patient name: Fermin Ordoñez  : 1955  MRN: [id number]  Referring provider: Jakub Rodas MD  Dx:   Encounter Diagnosis     ICD-10-CM    1  Primary osteoarthritis of left knee M17 12    2  History of total left knee replacement Z96 652        Start Time: 0802  Stop Time:   Total time in clinic (min): 57 minutes    Subjective: Patient with nothing new to report this morning  She has her follow up appointment with MD later today  Pain  Current pain ratin  At best pain ratin  At worst pain ratin  Quality: tight  Relieving factors: ice, change in position and medications  Progression: improved      Objective: See treatment diary below    Tenderness   Left Knee   Patient denies TTP       Neurological Testing  Sensation  Knee   Left Knee   Intact: light touch     Active Range of Motion   Left Knee   Flexion: 1 10 degrees   Extension: 0 degrees      Passive Range of Motion   Left Knee   Flexion: 1 12 degrees   Extension: 0 degrees      Strength/Myotome Testing  Left Hip   Planes of Motion   Flexion:  5  Extension:  5  Abduction: 4 +  Adduction: 5     Isolated Muscles   Gluteus coty:  5  Gluteus medius: 4 +     Left Knee   Flexion:  5  Extension:  5  Quadriceps contraction: good    FOTO score improved from 35 to 49 in 25 visits  Assessment: Gianluca Wilson has been compliant with attending PT and HEP since initial eval  Gianluca Fly has made improvements in objective data since IE but is still limited with ROM and strength compared to normal  Gianluca Fly continues with above listed impairments and would benefit from additional skilled PT to address these deficits to return to PLOF  Goals  STG  1) L knee ROM will improve to at least 100 degrees in 4 weeks  -Met  2) L knee strength will improve 1/2 grade in 4 weeks  -Met  3) B/L hip strength will improve 1/2 grade in 4 weeks  -Met  LTG  1) Patient is independent in HEP  -Met  2) Patient will ascend/descend one flight of stairs independently with no increased pain in 8 weeks  -Met  3) Patient will ambulate independently and safely with no AD for 200 feet on level surface in 8 weeks   -Met  4) L knee ROM will be at least 115 degrees in 8 weeks  -In progress      Plan: Patient would benefit from: skilled physical therapy  Planned modality interventions: cryotherapy  Planned therapy interventions: joint mobilization, manual therapy, muscle pump exercises, neuromuscular re-education, patient education, strengthening, stretching, therapeutic activities, therapeutic exercise, home exercise program, gait training, flexibility, balance/weight bearing training, massage, abdominal trunk stabilization, body mechanics training, postural training and functional ROM exercises  Frequency: 2x week  Duration in weeks:  3  Treatment plan discussed with: patient       Precautions: none    Daily Treatment Diary     Manuals 12/9 12/12 12/16 12/19 12/23        L knee PROM flex/extension AN AN AN EH AN                                  Exercise Diary              Bike 10 min 10 min 10 min 10 min 10 min        Leg press flexion stretch 10 sec  x10 10 sec  x10 10 sec  x10 10"x 10 10" x10                                  Prone quad stretch 30"x3 30"x3 30"x3 30"x3 30"x3        Glute bridge With add 2x15 With add 2x15 With add 2x15 With add  2x15 With add 2x15        SLR flexion 2# 2x15 2# 2x15 2# 2x15 2# 2x15 2# 2x15                     LAQ 2# 2x15 2# 2x15 2# 2x15 2#  2x15 2# 2x15        TB HS curls GTB 2x10 GTB 2x10 GTB 2x10 Green  2x10 GTB 2x15                     Leg press S/L -seat 7 NV NV 45# 2x10 45#  2x10 45# 3x10        Multihip abd B/L 25# 2x10 25# 2x10 25# 3x10 25#  3x10 25# 3x10        Multihip flex B/L 25# 2x10 25# 2x10 25# 3x10 25#  3x10 25# 3x10                                                            Modalities             CP post prn home 10 min defers def def

## 2019-12-23 NOTE — PROGRESS NOTES
PT Re-Evaluation     Today's date: 2019  Patient name: Yamilex Neri  : 1955  MRN: [id number]  Referring provider: Mikala Woodward MD  Dx:   Encounter Diagnosis     ICD-10-CM    1  Primary osteoarthritis of left knee M17 12    2  History of total left knee replacement Z96 652        Start Time: 0802  Stop Time: 0700  Total time in clinic (min): 57 minutes    Subjective: Patient with nothing new to report this morning  She has her follow up appointment with MD later today  Pain  Current pain ratin  At best pain ratin  At worst pain ratin  Quality: tight  Relieving factors: ice, change in position and medications  Progression: improved      Objective: See treatment diary below  Tenderness   Left Knee   Patient denies TTP       Neurological Testing  Sensation  Knee   Left Knee   Intact: light touch     Active Range of Motion   Left Knee   Flexion: 110 degrees   Extension: 0 degrees      Passive Range of Motion   Left Knee   Flexion: 112 degrees   Extension: 0 degrees      Strength/Myotome Testing  Left Hip   Planes of Motion   Flexion: 5  Extension: 5  Abduction: 4+  Adduction: 5     Isolated Muscles   Gluteus coty: 5  Gluteus medius: 4+     Left Knee   Flexion: 5  Extension: 5  Quadriceps contraction: good    FOTO score improved from 35 to 49 in 25 visits  Assessment: Yamilex Neri has been compliant with attending PT and HEP since initial eval  Yamilex Neri has made improvements in objective data since IE but is still limited with ROM and strength compared to normal  Yamilex Neri continues with above listed impairments and would benefit from additional skilled PT to address these deficits to return to PLOF  Goals  STG  1) L knee ROM will improve to at least 100 degrees in 4 weeks  -Met  2) L knee strength will improve 1/2 grade in 4 weeks  -Met  3) B/L hip strength will improve 1/2 grade in 4 weeks  -Met  LTG  1) Patient is independent in HEP   -Met  2) Patient will ascend/descend one flight of stairs independently with no increased pain in 8 weeks  -Met  3) Patient will ambulate independently and safely with no AD for 200 feet on level surface in 8 weeks   -Met  4) L knee ROM will be at least 115 degrees in 8 weeks  -In progress      Plan: Patient would benefit from: skilled physical therapy  Planned modality interventions: cryotherapy  Planned therapy interventions: joint mobilization, manual therapy, muscle pump exercises, neuromuscular re-education, patient education, strengthening, stretching, therapeutic activities, therapeutic exercise, home exercise program, gait training, flexibility, balance/weight bearing training, massage, abdominal trunk stabilization, body mechanics training, postural training and functional ROM exercises  Frequency: 2x week  Duration in weeks: 3  Treatment plan discussed with: patient       Precautions: none    Daily Treatment Diary     Manuals 12/9 12/12 12/16 12/19 12/23        L knee PROM flex/extension AN AN AN EH AN                                  Exercise Diary              Bike 10 min 10 min 10 min 10 min 10 min        Leg press flexion stretch 10 sec  x10 10 sec  x10 10 sec  x10 10"x 10 10" x10                                  Prone quad stretch 30"x3 30"x3 30"x3 30"x3 30"x3        Glute bridge With add 2x15 With add 2x15 With add 2x15 With add  2x15 With add 2x15        SLR flexion 2# 2x15 2# 2x15 2# 2x15 2# 2x15 2# 2x15                     LAQ 2# 2x15 2# 2x15 2# 2x15 2#  2x15 2# 2x15        TB HS curls GTB 2x10 GTB 2x10 GTB 2x10 Green  2x10 GTB 2x15                     Leg press S/L -seat 7 NV NV 45# 2x10 45#  2x10 45# 3x10        Multihip abd B/L 25# 2x10 25# 2x10 25# 3x10 25#  3x10 25# 3x10        Multihip flex B/L 25# 2x10 25# 2x10 25# 3x10 25#  3x10 25# 3x10                                                            Modalities             CP post prn home 10 min defers def def

## 2019-12-26 ENCOUNTER — OFFICE VISIT (OUTPATIENT)
Dept: PHYSICAL THERAPY | Facility: CLINIC | Age: 64
End: 2019-12-26
Payer: COMMERCIAL

## 2019-12-26 DIAGNOSIS — Z96.652 HISTORY OF TOTAL LEFT KNEE REPLACEMENT: ICD-10-CM

## 2019-12-26 DIAGNOSIS — M17.12 PRIMARY OSTEOARTHRITIS OF LEFT KNEE: Primary | ICD-10-CM

## 2019-12-26 PROCEDURE — 97112 NEUROMUSCULAR REEDUCATION: CPT

## 2019-12-26 PROCEDURE — 97110 THERAPEUTIC EXERCISES: CPT

## 2019-12-26 NOTE — PROGRESS NOTES
Daily Note     Today's date: 2019  Patient name: Wilman Royal  : 1955  MRN: [id number]  Referring provider: Soren Rascon MD  Dx:   Encounter Diagnosis     ICD-10-CM    1  Primary osteoarthritis of left knee M17 12    2  History of total left knee replacement Z96 652        Start Time: 07  Stop Time: 0820  Total time in clinic (min): 45 minutes    Subjective: Patient tells me her knee is very stiff this morning, noting she was wearing boots yesterday for the first time since before surgery  She says she felt okay wearing them but now feels the pain and soreness today  Objective: See treatment diary below  Patient requests going "light" today as she feels overly exhausted  Assessment: Tolerated treatment fair  Patient fatigues quickly with her exercises this visit, requiring multiple rest breaks  Patient demonstrated fatigue post treatment and exhibited good technique with therapeutic exercises      Plan: Continue per plan of care           Precautions: none    Daily Treatment Diary    Manuals        L knee PROM flex/extension AN AN AN EH AN NP                                 Exercise Diary              Bike 10 min 10 min 10 min 10 min 10 min 10 min       Leg press flexion stretch 10 sec  x10 10 sec  x10 10 sec  x10 10"x 10 10" x10 10" x10                                 Prone quad stretch 30"x3 30"x3 30"x3 30"x3 30"x3 30"x3       Glute bridge With add 2x15 With add 2x15 With add 2x15 With add  2x15 With add 2x15 NV       SLR flexion 2# 2x15 2# 2x15 2# 2x15 2# 2x15 2# 2x15 0# 2x15                    LAQ 2# 2x15 2# 2x15 2# 2x15 2#  2x15 2# 2x15 NV       TB HS curls GTB 2x10 GTB 2x10 GTB 2x10 Green  2x10 GTB 2x15 NV                    Leg press S/L -seat 7 NV NV 45# 2x10 45#  2x10 45# 3x10 45# 3x10       Multihip abd B/L 25# 2x10 25# 2x10 25# 3x10 25#  3x10 25# 3x10 25# 3x10       Multihip flex B/L 25# 2x10 25# 2x10 25# 3x10 25#  3x10 25# 3x10 25# 3x10 Modalities             CP post prn home 10 min defers def def def

## 2019-12-30 ENCOUNTER — OFFICE VISIT (OUTPATIENT)
Dept: PHYSICAL THERAPY | Facility: CLINIC | Age: 64
End: 2019-12-30
Payer: COMMERCIAL

## 2019-12-30 DIAGNOSIS — M17.12 PRIMARY OSTEOARTHRITIS OF LEFT KNEE: Primary | ICD-10-CM

## 2019-12-30 DIAGNOSIS — Z96.652 HISTORY OF TOTAL LEFT KNEE REPLACEMENT: ICD-10-CM

## 2019-12-30 PROCEDURE — 97112 NEUROMUSCULAR REEDUCATION: CPT

## 2019-12-30 PROCEDURE — 97110 THERAPEUTIC EXERCISES: CPT

## 2019-12-30 NOTE — PROGRESS NOTES
Daily Note     Today's date: 2019  Patient name: Jocelyn Fish  : 1955  MRN: [id number]  Referring provider: Nikita Shipman MD  Dx:   Encounter Diagnosis     ICD-10-CM    1  Primary osteoarthritis of left knee M17 12    2  History of total left knee replacement Z96 652                   Subjective: Patient admits to some soreness in L knee today  Objective: See treatment diary below  Assessment: Over the last few weeks has made visual improvement with knee flex mobility, nearing closer to available range on R  Able to resume some strengthening exercises that were held last treatment due to fatigue  Continues to fatigue throughout strengthening program due to unresolved weakness  Plan: Continue per plan of care             Precautions: none    Daily Treatment Diary  Manuals       L knee PROM flex/extension AN AN AN EH AN NP EH                                Exercise Diary              Bike 10 min 10 min 10 min 10 min 10 min 10 min 10 min      Leg press flexion stretch 10 sec  x10 10 sec  x10 10 sec  x10 10"x 10 10" x10 10" x10 10"x 10                                Prone quad stretch 30"x3 30"x3 30"x3 30"x3 30"x3 30"x3 30"x3      Glute bridge With add 2x15 With add 2x15 With add 2x15 With add  2x15 With add 2x15 NV With add  2x15      SLR flexion 2# 2x15 2# 2x15 2# 2x15 2# 2x15 2# 2x15 0# 2x15 2#  2x15                   LAQ 2# 2x15 2# 2x15 2# 2x15 2#  2x15 2# 2x15 NV 2#  2x15      TB HS curls GTB 2x10 GTB 2x10 GTB 2x10 Green  2x10 GTB 2x15 NV Green  2x15                   Leg press S/L -seat 7 NV NV 45# 2x10 45#  2x10 45# 3x10 45# 3x10 55#  3x10      Multihip abd B/L 25# 2x10 25# 2x10 25# 3x10 25#  3x10 25# 3x10 25# 3x10 25#  3x10      Multihip flex B/L 25# 2x10 25# 2x10 25# 3x10 25#  3x10 25# 3x10 25# 3x10 25#  3x10                                                          Modalities             CP post prn home 10 min defers def def def def

## 2020-01-02 ENCOUNTER — OFFICE VISIT (OUTPATIENT)
Dept: PHYSICAL THERAPY | Facility: CLINIC | Age: 65
End: 2020-01-02
Payer: COMMERCIAL

## 2020-01-02 DIAGNOSIS — M17.12 PRIMARY OSTEOARTHRITIS OF LEFT KNEE: Primary | ICD-10-CM

## 2020-01-02 DIAGNOSIS — Z96.652 HISTORY OF TOTAL LEFT KNEE REPLACEMENT: ICD-10-CM

## 2020-01-02 PROCEDURE — 97110 THERAPEUTIC EXERCISES: CPT

## 2020-01-02 PROCEDURE — 97112 NEUROMUSCULAR REEDUCATION: CPT

## 2020-01-02 NOTE — PROGRESS NOTES
Daily Note     Today's date: 2020  Patient name: Alexander Chavarria  : 1955  MRN: [id number]  Referring provider: Shahzad Carmona MD  Dx:   Encounter Diagnosis     ICD-10-CM    1  Primary osteoarthritis of left knee M17 12    2  History of total left knee replacement Z96 652                   Subjective: Patient reported having continued soreness in L knee  Objective: See treatment diary below  Assessment: Increased fatigue with strengthening exercises on Multihip, more on L compared to R  Knee flex ROM near comparable to R, ext on L> R  Unresolved swelling around knee  Still with functional weakness causing ongoing muscle fatigue with activity  Consider progression below NV  Plan: Continue per plan of care             Precautions: none    Daily Treatment Diary  Manuals      L knee PROM flex/extension AN AN AN EH AN NP [facility] EH                               Exercise Diary              Bike 10 min 10 min 10 min 10 min 10 min 10 min 10 min 10 min     Leg press flexion stretch 10 sec  x10 10 sec  x10 10 sec  x10 10"x 10 10" x10 10" x10 10"x 10 10"x 10                               Prone quad stretch 30"x3 30"x3 30"x3 30"x3 30"x3 30"x3 30"x3 30"x3     Glute bridge With add 2x15 With add 2x15 With add 2x15 With add  2x15 With add 2x15 NV With add  2x15 With add  2x15     SLR flexion 2# 2x15 2# 2x15 2# 2x15 2# 2x15 2# 2x15 0# 2x15 2#  2x15 2#  2x15                  LAQ 2# 2x15 2# 2x15 2# 2x15 2#  2x15 2# 2x15 NV 2#  2x15 2#  2x15     TB HS curls GTB 2x10 GTB 2x10 GTB 2x10 Green  2x10 GTB 2x15 NV Green  2x15 Green  2x15                  Leg press S/L -seat 7 NV NV 45# 2x10 45#  2x10 45# 3x10 45# 3x10 55#  3x10 55#  3x10     Multihip abd B/L 25# 2x10 25# 2x10 25# 3x10 25#  3x10 25# 3x10 25# 3x10 25#  3x10 25#  3x10     Multihip flex B/L 25# 2x10 25# 2x10 25# 3x10 25#  3x10 25# 3x10 25# 3x10 25#  3x10 25#  3x10                  Rockerboard balance        NV Modalities             CP post prn home 10 min defers

## 2020-01-06 ENCOUNTER — OFFICE VISIT (OUTPATIENT)
Dept: PHYSICAL THERAPY | Facility: CLINIC | Age: 65
End: 2020-01-06
Payer: COMMERCIAL

## 2020-01-06 DIAGNOSIS — M17.12 PRIMARY OSTEOARTHRITIS OF LEFT KNEE: Primary | ICD-10-CM

## 2020-01-06 DIAGNOSIS — Z96.652 HISTORY OF TOTAL LEFT KNEE REPLACEMENT: ICD-10-CM

## 2020-01-06 PROCEDURE — 97112 NEUROMUSCULAR REEDUCATION: CPT

## 2020-01-06 PROCEDURE — 97110 THERAPEUTIC EXERCISES: CPT

## 2020-01-06 NOTE — PROGRESS NOTES
Daily Note     Today's date: 2020  Patient name: Chacorta Dotson  : 1955  MRN: [id number]  Referring provider: Tyshawn Gay MD  Dx:   Encounter Diagnosis     ICD-10-CM    1  Primary osteoarthritis of left knee M17 12    2  History of total left knee replacement Z96 652                   Subjective: Patient reported having continued soreness in L knee  At times still has pain, sometimes with stairs  Also some days able to complete stairs "normal" and others finds herself compensating due to stiffness  Objective: See treatment diary below  Assessment: LE weakness likely contributing factor to pain on stairs placing stress on knee due to weakness  L knee flex ROM improved post manual  Challenged with progression of stability exercises  Plan: Continue per plan of care             Precautions: none    Daily Treatment Diary  Manuals     L knee PROM flex/extension AN AN AN EH AN NP EH EH EH                              Exercise Diary              Bike 10 min 10 min 10 min 10 min 10 min 10 min 10 min 10 min 10 min    Leg press flexion stretch 10 sec  x10 10 sec  x10 10 sec  x10 10"x 10 10" x10 10" x10 10"x 10 10"x 10 10"x 10                              Prone quad stretch 30"x3 30"x3 30"x3 30"x3 30"x3 30"x3 30"x3 30"x3 30"x3    Glute bridge With add 2x15 With add 2x15 With add 2x15 With add  2x15 With add 2x15 NV With add  2x15 With add  2x15 With add  2x15    SLR flexion 2# 2x15 2# 2x15 2# 2x15 2# 2x15 2# 2x15 0# 2x15 2#  2x15 2#  2x15 2#  2x15                 LAQ 2# 2x15 2# 2x15 2# 2x15 2#  2x15 2# 2x15 NV 2#  2x15 2#  2x15 3#  2x15    TB HS curls GTB 2x10 GTB 2x10 GTB 2x10 Green  2x10 GTB 2x15 NV Green  2x15 Green  2x15 Green  2x15                 Leg press S/L -seat 7 NV NV 45# 2x10 45#  2x10 45# 3x10 45# 3x10 55#  3x10 55#  3x10 55#  3x10    Multihip abd B/L 25# 2x10 25# 2x10 25# 3x10 25#  3x10 25# 3x10 25# 3x10 25#  3x10 25#  3x10 25#  3x10 Multihip flex B/L 25# 2x10 25# 2x10 25# 3x10 25#  3x10 25# 3x10 25# 3x10 25#  3x10 25#  3x10 25#  3x10                 Rockerboard balance        NV 1 min ea                              Modalities             CP post prn home 10 min defers

## 2020-01-09 ENCOUNTER — OFFICE VISIT (OUTPATIENT)
Dept: PHYSICAL THERAPY | Facility: CLINIC | Age: 65
End: 2020-01-09
Payer: COMMERCIAL

## 2020-01-09 DIAGNOSIS — Z96.652 HISTORY OF TOTAL LEFT KNEE REPLACEMENT: ICD-10-CM

## 2020-01-09 DIAGNOSIS — M17.12 PRIMARY OSTEOARTHRITIS OF LEFT KNEE: Primary | ICD-10-CM

## 2020-01-09 PROCEDURE — 97110 THERAPEUTIC EXERCISES: CPT

## 2020-01-09 PROCEDURE — 97112 NEUROMUSCULAR REEDUCATION: CPT

## 2020-01-09 NOTE — PROGRESS NOTES
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  Objective: See treatment diary below  Assessment: Presents with general fatigue today  Still having difficulty with ROM on recumbent bike but has demonstrated improved PROM flex, more comparable to R  A few instances of LOB on Rockerboard  Consider step ups / downs NV to promote functional strengthening  Plan: Continue per plan of care             Precautions: none    Daily Treatment Diary  Manuals    L knee PROM flex/extension AN AN AN EH AN NP EH EH EH EH                             Exercise Diary              Bike 10 min 10 min 10 min 10 min 10 min 10 min 10 min 10 min 10 min 10 min   Leg press flexion stretch 10 sec  x10 10 sec  x10 10 sec  x10 10"x 10 10" x10 10" x10 10"x 10 10"x 10 10"x 10 10"x  10                             Prone quad stretch 30"x3 30"x3 30"x3 30"x3 30"x3 30"x3 30"x3 30"x3 30"x3 30"x3   Glute bridge With add 2x15 With add 2x15 With add 2x15 With add  2x15 With add 2x15 NV With add  2x15 With add  2x15 With add  2x15 With add  2x15   SLR flexion 2# 2x15 2# 2x15 2# 2x15 2# 2x15 2# 2x15 0# 2x15 2#  2x15 2#  2x15 2#  2x15 2#  2x15                LAQ 2# 2x15 2# 2x15 2# 2x15 2#  2x15 2# 2x15 NV 2#  2x15 2#  2x15 3#  2x15 3#  2x15   TB HS curls GTB 2x10 GTB 2x10 GTB 2x10 Green  2x10 GTB 2x15 NV Green  2x15 Green  2x15 Green  2x15 Green  2x15                Leg press S/L -seat 7 NV NV 45# 2x10 45#  2x10 45# 3x10 45# 3x10 55#  3x10 55#  3x10 55#  3x10 55#  3x10   Multihip abd B/L 25# 2x10 25# 2x10 25# 3x10 25#  3x10 25# 3x10 25# 3x10 25#  3x10 25#  3x10 25#  3x10 25#  3x10   Multihip flex B/L 25# 2x10 25# 2x10 25# 3x10 25#  3x10 25# 3x10 25# 3x10 25#  3x10 25#  3x10 25#  3x10 25#  3x10                Rockerboard balance        NV 1 min ea 1 min ea                             Modalities             CP post prn home 10 min defers

## 2020-01-13 ENCOUNTER — OFFICE VISIT (OUTPATIENT)
Dept: PHYSICAL THERAPY | Facility: CLINIC | Age: 65
End: 2020-01-13
Payer: COMMERCIAL

## 2020-01-13 DIAGNOSIS — M17.12 PRIMARY OSTEOARTHRITIS OF LEFT KNEE: Primary | ICD-10-CM

## 2020-01-13 DIAGNOSIS — Z96.652 HISTORY OF TOTAL LEFT KNEE REPLACEMENT: ICD-10-CM

## 2020-01-13 PROCEDURE — 97140 MANUAL THERAPY 1/> REGIONS: CPT

## 2020-01-13 PROCEDURE — 97110 THERAPEUTIC EXERCISES: CPT

## 2020-01-13 NOTE — PROGRESS NOTES
Daily Note     Today's date: 2020  Patient name: Maylin Rodriguez  : 1955  MRN: [id number]  Referring provider: Jennifer Cao MD  Dx:   Encounter Diagnosis     ICD-10-CM    1  Primary osteoarthritis of left knee M17 12    2  History of total left knee replacement Z96 652        Start Time: 0804  Stop Time: 0856  Total time in clinic (min): 52 minutes    Subjective: Patient reports her knee is feeling more stiff/sore this morning than it normally does, saying she feels a little pain (rates as 2/10)  Objective: See treatment diary below  Assessment: Patient tolerates treatment well today, progressing through all activities with no need for rest break  She does show fatigue by end of session  Good ROM exhibited with passive manuals, showing improved range of flexion on left, about 5-8 degrees less than right  Plan: Continue per plan of care           Precautions: none    Daily Treatment Diary  Manuals             L knee PROM flex/extension AN                                      Exercise Diary              Bike 10 min            Leg press flexion stretch 10 sec  x10 D/C                        Prone quad stretch 30"x3            Glute bridge With add 2x15            SLR flexion 2 5# 2x15                         LAQ 3# 2x15            TB HS curls GTB  2x15                         Leg press S/L -seat 7 65# 3x10            Multihip abd B/L 25# 3x10            Multihip flex B/L 25# 3x10                         Rockerboard balance 1 min ea            Fwd step up/down NV                         Modalities             CP post prn

## 2020-01-16 ENCOUNTER — OFFICE VISIT (OUTPATIENT)
Dept: PHYSICAL THERAPY | Facility: CLINIC | Age: 65
End: 2020-01-16
Payer: COMMERCIAL

## 2020-01-16 DIAGNOSIS — M17.12 PRIMARY OSTEOARTHRITIS OF LEFT KNEE: Primary | ICD-10-CM

## 2020-01-16 DIAGNOSIS — Z96.652 HISTORY OF TOTAL LEFT KNEE REPLACEMENT: ICD-10-CM

## 2020-01-16 PROCEDURE — 97140 MANUAL THERAPY 1/> REGIONS: CPT

## 2020-01-16 PROCEDURE — 97112 NEUROMUSCULAR REEDUCATION: CPT

## 2020-01-16 PROCEDURE — 97110 THERAPEUTIC EXERCISES: CPT

## 2020-01-16 NOTE — PROGRESS NOTES
Daily Note     Today's date: 2020  Patient name: Paul Ghotra  : 1955  MRN: [id number]  Referring provider: Annie Tirado MD  Dx:   Encounter Diagnosis     ICD-10-CM    1  Primary osteoarthritis of left knee M17 12    2  History of total left knee replacement Z96 652                   Subjective: Patient continues to note stiffness in L knee  She recently was able to "run down the steps normally" but at times she still has difficulty completing  Feels she has had more swelling this week  Objective: See treatment diary below  Assessment: Some clicking during passive flex however patient did not note any discomfort  Added step ups to address functional limitations  Poor quad control on step downs  Will continue to progress to address residual weakness  Plan: Continue per plan of care             Precautions: none    Daily Treatment Diary  Manuals            L knee PROM flex/extension AN [facility]                                     Exercise Diary              Bike 10 min 10 min           Leg press flexion stretch 10 sec  x10 D/C                        Prone quad stretch 30"x3 30"x3           Glute bridge With add 2x15 With add  2x15           SLR flexion 2 5# 2x15 2 5#  2x15                        LAQ 3# 2x15 3#  2x15           TB HS curls GTB  2x15 Green  2x15                        Leg press S/L -seat 7 65# 3x10 65#  3x10           Multihip abd B/L 25# 3x10 25#  3x10           Multihip flex B/L 25# 3x10 25#  3x10                        Rockerboard balance 1 min ea 1 min ea           Fwd step up/down NV 6"  2x10                        Modalities             CP post prn

## 2020-01-21 ENCOUNTER — OFFICE VISIT (OUTPATIENT)
Dept: PHYSICAL THERAPY | Facility: CLINIC | Age: 65
End: 2020-01-21
Payer: COMMERCIAL

## 2020-01-21 DIAGNOSIS — M17.12 PRIMARY OSTEOARTHRITIS OF LEFT KNEE: Primary | ICD-10-CM

## 2020-01-21 DIAGNOSIS — Z96.652 HISTORY OF TOTAL LEFT KNEE REPLACEMENT: ICD-10-CM

## 2020-01-21 PROCEDURE — 97112 NEUROMUSCULAR REEDUCATION: CPT

## 2020-01-21 PROCEDURE — 97140 MANUAL THERAPY 1/> REGIONS: CPT

## 2020-01-21 PROCEDURE — 97110 THERAPEUTIC EXERCISES: CPT

## 2020-01-21 NOTE — PROGRESS NOTES
Daily Note     Today's date: 2020  Patient name: Stacy Mercado  : 1955  MRN: [id number]  Referring provider: Raquel Du MD  Dx:   Encounter Diagnosis     ICD-10-CM    1  Primary osteoarthritis of left knee M17 12    2  History of total left knee replacement Z96 652                   Subjective: Patient reported having increased stiffness in L knee since the weekend with noticeable increase in swelling and "clicking"  Thinks that she may have done too much  Objective: See treatment diary below  Assessment: Swelling did increase in L knee since last visit  Decreased quad control with step downs  Fatigued post treatment  Plan: Continue per plan of care             Precautions: none    Daily Treatment Diary  Manuals           L knee PROM flex/extension AN [facility]                                    Exercise Diary              Bike 10 min 10 min 10 min          Leg press flexion stretch 10 sec  x10 D/C                        Prone quad stretch 30"x3 30"x3 30"x3          Glute bridge With add 2x15 With add  2x15 With add  2x15          SLR flexion 2 5# 2x15 2 5#  2x15 2 5#  2x15                       LAQ 3# 2x15 3#  2x15 3#  2x15          TB HS curls GTB  2x15 Green  2x15 Green  2x15                       Leg press S/L -seat 7 65# 3x10 65#  3x10 65#  3x10          Multihip abd B/L 25# 3x10 25#  3x10 25#  3x10          Multihip flex B/L 25# 3x10 25#  3x10 25#  3x10                       Rockerboard balance 1 min ea 1 min ea 2 min ea          Fwd step up/down NV 6"  2x10 6"  2x10                       Modalities             CP post prn

## 2020-01-23 ENCOUNTER — OFFICE VISIT (OUTPATIENT)
Dept: PHYSICAL THERAPY | Facility: CLINIC | Age: 65
End: 2020-01-23
Payer: COMMERCIAL

## 2020-01-23 DIAGNOSIS — Z96.652 HISTORY OF TOTAL LEFT KNEE REPLACEMENT: ICD-10-CM

## 2020-01-23 DIAGNOSIS — M17.12 PRIMARY OSTEOARTHRITIS OF LEFT KNEE: Primary | ICD-10-CM

## 2020-01-23 PROCEDURE — 97140 MANUAL THERAPY 1/> REGIONS: CPT | Performed by: PHYSICAL THERAPY ASSISTANT

## 2020-01-23 PROCEDURE — 97112 NEUROMUSCULAR REEDUCATION: CPT | Performed by: PHYSICAL THERAPY ASSISTANT

## 2020-01-23 PROCEDURE — 97110 THERAPEUTIC EXERCISES: CPT | Performed by: PHYSICAL THERAPY ASSISTANT

## 2020-01-23 NOTE — PROGRESS NOTES
Daily Note     Today's date: 2020  Patient name: Alessandro Austin  : 1955  MRN: [id number]  Referring provider: Linda Steve MD  Dx:   Encounter Diagnosis     ICD-10-CM    1  Primary osteoarthritis of left knee M17 12    2  History of total left knee replacement Z96 652                   Subjective: No new complaints  Objective: See treatment diary below  Assessment: Good tolerance to TE as noted  Pt demonstrates good technique with TE and would benefit from continued therapy  Plan: Continue per plan of care             Precautions: none    Daily Treatment Diary  Manuals          L knee PROM flex/extension AN [facility] RK                                   Exercise Diary              Bike 10 min 10 min 10 min 10 min         Leg press flexion stretch 10 sec  x10 D/C                        Prone quad stretch 30"x3 30"x3 30"x3 30"x3         Glute bridge With add 2x15 With add  2x15 With add  2x15 W/ ADD  2x15         SLR flexion 2 5# 2x15 2 5#  2x15 2 5#  2x15 2 5#  2x15                      LAQ 3# 2x15 3#  2x15 3#  2x15 3#  2x15         TB HS curls GTB  2x15 Green  2x15 Green  2x15 GTB  2x15                      Leg press S/L -seat 7 65# 3x10 65#  3x10 65#  3x10 75#  3x10         Multihip abd B/L 25# 3x10 25#  3x10 25#  3x10 25#  3x10         Multihip flex B/L 25# 3x10 25#  3x10 25#  3x10 25#  3x10                      Rockerboard balance 1 min ea 1 min ea 2 min ea 2 min ea         Fwd step up/down NV 6"  2x10 6"  2x10 6"  3x10                      Modalities             CP post prn

## 2020-01-27 ENCOUNTER — OFFICE VISIT (OUTPATIENT)
Dept: PHYSICAL THERAPY | Facility: CLINIC | Age: 65
End: 2020-01-27
Payer: COMMERCIAL

## 2020-01-27 ENCOUNTER — TRANSCRIBE ORDERS (OUTPATIENT)
Dept: PHYSICAL THERAPY | Facility: CLINIC | Age: 65
End: 2020-01-27

## 2020-01-27 DIAGNOSIS — Z96.652 HISTORY OF TOTAL LEFT KNEE REPLACEMENT: ICD-10-CM

## 2020-01-27 DIAGNOSIS — M17.12 PRIMARY OSTEOARTHRITIS OF LEFT KNEE: Primary | ICD-10-CM

## 2020-01-27 PROCEDURE — 97110 THERAPEUTIC EXERCISES: CPT

## 2020-01-27 NOTE — PROGRESS NOTES
PT Re-Evaluation     Today's date: 2020  Patient name: Chas Murphy  : 1955  MRN: [id number]  Referring provider: Cm Dixon MD  Dx:   Encounter Diagnosis     ICD-10-CM    1  Primary osteoarthritis of left knee M17 12    2  History of total left knee replacement Z96 652        Start Time: 1105  Stop Time: 1157  Total time in clinic (min): 52 minutes    Subjective: Patient presents today stating her knee feels better than it normally does at PT, because she is here later in the day rather than first thing in the morning  She says when she comes early her knee usually feels more tight but today she has been out running errands and it feels a little better  Pain  Current pain ratin  At best pain ratin  At worst pain ratin  Quality: tight  Relieving factors: ice, change in position, and medications  Progression: improved      Objective: See treatment diary below  Tenderness   Left Knee   Patient denies TTP       Neurological Testing  Sensation  Knee   Left Knee   Intact: light touch     Active Range of Motion   Left Knee   Flexion: 115 degrees   Extension: 0 degrees      Passive Range of Motion   Left Knee   Flexion: 115 degrees   Extension: 0 degrees      Strength/Myotome Testing  Left Hip   Planes of Motion   Flexion: 5  Extension: 5  Abduction: 4+  Adduction: 5     Isolated Muscles   Gluteus coty: 5  Gluteus medius: 4+     Left Knee   Flexion: 5  Extension: 5  Quadriceps contraction: good      Assessment: Chas Murphy has been compliant with attending PT and HEP since initial eval  Chas Murphy has made improvements in objective data since IE but is still limited with ROM and strength compared to normal  Chas Murphy continues with above listed impairments and would benefit from additional skilled PT to address these deficits to return to PLOF  Goals  STG  1) L knee ROM will improve to at least 100 degrees in 4 weeks   -Met  2) L knee strength will improve 1/2 grade in 4 weeks  -Met  3) B/L hip strength will improve 1/2 grade in 4 weeks  -Met  LTG  1) Patient is independent in HEP  -Met  2) Patient will ascend/descend one flight of stairs independently with no increased pain in 8 weeks  -Met  3) Patient will ambulate independently and safely with no AD for 200 feet on level surface in 8 weeks  -Met  4) L knee ROM will be at least 115 degrees in 8 weeks  -Met      Plan: Patient would benefit from: skilled physical therapy   Patient will finish attending the remainder of her PT visits before discharging to fitness program and independent home exercise program   Planned modality interventions: cryotherapy  Planned therapy interventions: joint mobilization, manual therapy, muscle pump exercises, neuromuscular re-education, patient education, strengthening, stretching, therapeutic activities, therapeutic exercise, home exercise program, gait training, flexibility, balance/weight bearing training, massage, abdominal trunk stabilization, body mechanics training, postural training and functional ROM exercises  Frequency: 1-2x week  Duration in weeks: 2  Treatment plan discussed with: patient       Precautions: none    Daily Treatment Diary  Manuals 1/13 1/16 1/21 1/23 1/27        L knee PROM flex/extension AN EH EH RK AN                                  Exercise Diary              Bike 10 min 10 min 10 min 10 min 10 min        Leg press flexion stretch 10 sec  x10 D/C                        Prone quad stretch 30"x3 30"x3 30"x3 30"x3 30"x3        Glute bridge With add 2x15 With add  2x15 With add  2x15 W/ ADD  2x15 With add 2x15        SLR flexion 2 5# 2x15 2 5#  2x15 2 5#  2x15 2 5#  2x15 2 5# 2x15                     LAQ 3# 2x15 3#  2x15 3#  2x15 3#  2x15 3# 2x15        TB HS curls GTB  2x15 Green  2x15 Green  2x15 GTB  2x15 GTB 2x15                     Leg press S/L -seat 7 65# 3x10 65#  3x10 65#  3x10 75#  3x10 75# 3x10        Multihip abd B/L 25# 3x10 25#  3x10 25#  3x10 25#  3x10 25# 3x10        Multihip flex B/L 25# 3x10 25#  3x10 25#  3x10 25#  3x10 25# 3x10                     Rockerboard balance 1 min ea 1 min ea 2 min ea 2 min ea 2 min ea        Fwd step up/down NV 6"  2x10 6"  2x10 6"  3x10 6" 3x10                     Modalities             CP post prn

## 2020-01-27 NOTE — LETTER
2020    Eloina Coppola MD  [address] [place] [address]    Patient: Chas Murphy   YOB: 1955   Date of Visit: 2020     Encounter Diagnosis     ICD-10-CM    1  Primary osteoarthritis of left knee M17 12    2  History of total left knee replacement [id number]        Dear Dr Genie Jensen:    Thank you for your recent referral of Chas Murphy  Please review the attached evaluation summary from Chana's recent visit  Please verify that you agree with the plan of care by signing the attached order  If you have any questions or concerns, please do not hesitate to call  I sincerely appreciate the opportunity to share in the care of one of your patients and hope to have another opportunity to work with you in the near future  Sincerely,    Kulwant Barnhart, PT      Referring Provider:      I certify that I have read the below Plan of Care and certify the need for these services furnished under this plan of treatment while under my care  Eloina Coppola MD  [facility]: [phone]          PT Re-Evaluation     Today's date: 2020  Patient name: Chas Murphy  : 1955  MRN: [id number]  Referring provider: Cm Dixon MD  Dx:   Encounter Diagnosis     ICD-10-CM    1  Primary osteoarthritis of left knee M17 12    2  History of total left knee replacement Z96 652        Start Time: 1105  Stop Time: 1157  Total time in clinic (min): 52 minutes    Subjective: Patient presents today stating her knee feels better than it normally does at PT, because she is here later in the day rather than first thing in the morning  She says when she comes early her knee usually feels more tight but today she has been out running errands and it feels a little better    Pain  Current pain ratin  At best pain ratin  At worst pain ratin  Quality: tight  Relieving factors: ice, change in position, and medications  Progression: improved      Objective: See treatment diary below  Tenderness   Left Knee   Patient denies TTP       Neurological Testing  Sensation  Knee   Left Knee   Intact: light touch     Active Range of Motion   Left Knee   Flexion: 1 15 degrees   Extension: 0 degrees      Passive Range of Motion   Left Knee   Flexion: 11 5 degrees   Extension: 0 degrees      Strength/Myotome Testing  Left Hip   Planes of Motion   Flexion: 5  Extension: 5  Abduction: 4+  Adduction: 5     Isolated Muscles   Gluteus coty: 5  Gluteus medius: 4+     Left Knee   Flexion: 5  Extension: 5  Quadriceps contraction: good      Assessment: Frank Maharaj has been compliant with attending PT and HEP since initial eval  Frank Maharaj has made improvements in objective data since IE but is still limited with ROM and strength compared to normal  Frank Maharaj continues with above listed impairments and would benefit from additional skilled PT to address these deficits to return to PLOF  Goals  STG  1) L knee ROM will improve to at least 100 degrees in 4 weeks  -Met  2) L knee strength will improve 1/2 grade in 4 weeks  -Met  3) B/L hip strength will improve 1/2 grade in 4 weeks  -Met  LTG  1) Patient is independent in HEP  -Met  2) Patient will ascend/descend one flight of stairs independently with no increased pain in 8 weeks  -Met  3) Patient will ambulate independently and safely with no AD for 200 feet on level surface in 8 weeks  -Met  4) L knee ROM will be at least 115 degrees in 8 weeks  -Met      Plan: Patient would benefit from: skilled physical therapy   Patient will finish attending the remainder of her PT visits before discharging to fitness program and independent home exercise program   Planned modality interventions: cryotherapy  Planned therapy interventions: joint mobilization, manual therapy, muscle pump exercises, neuromuscular re-education, patient education, strengthening, stretching, therapeutic activities, therapeutic exercise, home exercise program, gait training, flexibility, balance/weight bearing training, massage, abdominal trunk stabilization, body mechanics training, postural training and functional ROM exercises  Frequency:  1-2x week  Duration in weeks:  2  Treatment plan discussed with: patient       Precautions: none    Daily Treatment Diary  Manuals 1/13 1/16 1/21 1/23 1/27        L knee PROM flex/extension AN EH EH RK AN                                  Exercise Diary              Bike 10 min 10 min 10 min 10 min 10 min        Leg press flexion stretch 10 sec  x10 D/C                        Prone quad stretch 30"x3 30"x3 30"x3 30"x3 30"x3        Glute bridge With add 2x15 With add  2x15 With add  2x15 W/ ADD  2x15 With add 2x15        SLR flexion 2 5# 2x15 2 5#  2x15 2 5#  2x15 2 5#  2x15 2 5# 2x15                     LAQ 3# 2x15 3#  2x15 3#  2x15 3#  2x15 3# 2x15        TB HS curls GTB  2x15 Green  2x15 Green  2x15 GTB  2x15 GTB 2x15                     Leg press S/L -seat 7 65# 3x10 65#  3x10 65#  3x10 75#  3x10 75# 3x10        Multihip abd B/L 25# 3x10 25#  3x10 25#  3x10 25#  3x10 25# 3x10        Multihip flex B/L 25# 3x10 25#  3x10 25#  3x10 25#  3x10 25# 3x10                     Rockerboard balance 1 min ea 1 min ea 2 min ea 2 min ea 2 min ea        Fwd step up/down NV 6"  2x10 6"  2x10 6"  3x10 6" 3x10                     Modalities             CP post prn

## 2020-01-30 ENCOUNTER — OFFICE VISIT (OUTPATIENT)
Dept: PHYSICAL THERAPY | Facility: CLINIC | Age: 65
End: 2020-01-30
Payer: COMMERCIAL

## 2020-01-30 DIAGNOSIS — Z96.652 HISTORY OF TOTAL LEFT KNEE REPLACEMENT: ICD-10-CM

## 2020-01-30 DIAGNOSIS — M17.12 PRIMARY OSTEOARTHRITIS OF LEFT KNEE: Primary | ICD-10-CM

## 2020-01-30 PROCEDURE — 97110 THERAPEUTIC EXERCISES: CPT

## 2020-01-30 PROCEDURE — 97112 NEUROMUSCULAR REEDUCATION: CPT

## 2020-01-30 NOTE — PROGRESS NOTES
Daily Note     Today's date: 2020  Patient name: Gianluca Wilson  : 1955  MRN: [id number]  Referring provider: Nicolas Kiran MD  Dx:   Encounter Diagnosis     ICD-10-CM    1  Primary osteoarthritis of left knee M17 12    2  History of total left knee replacement Z96 652                   Subjective: Patient continues to note L knee stiffness  Limited with time today, requesting holding some exercises  Objective: See treatment diary below  Assessment: Continued to focus on hip and knee strengthening  Limited quad control on step downs  Tightness and discomfort noted at end range knee flex  Plan: Continue per plan of care  Progress treatment as tolerated  Plans to transition to fitness program offered at clinic when discharged          Precautions: none    Daily Treatment Diary  Manuals        L knee PROM flex/extension AN EH EH RK AN EH                                 Exercise Diary              Bike 10 min 10 min 10 min 10 min 10 min 7 min       Leg press flexion stretch 10 sec  x10 D/C                        Prone quad stretch 30"x3 30"x3 30"x3 30"x3 30"x3 30"x3       Glute bridge With add 2x15 With add  2x15 With add  2x15 W/ ADD  2x15 With add 2x15 NV       SLR flexion 2 5# 2x15 2 5#  2x15 2 5#  2x15 2 5#  2x15 2 5# 2x15 NV                    LAQ 3# 2x15 3#  2x15 3#  2x15 3#  2x15 3# 2x15 NV       TB HS curls GTB  2x15 Green  2x15 Green  2x15 GTB  2x15 GTB 2x15 NV                    Leg press S/L -seat 7 65# 3x10 65#  3x10 65#  3x10 75#  3x10 75# 3x10 75#  3x10       Multihip abd B/L 25# 3x10 25#  3x10 25#  3x10 25#  3x10 25# 3x10 25#  3x10       Multihip flex B/L 25# 3x10 25#  3x10 25#  3x10 25#  3x10 25# 3x10 25#  3x10                    Rockerboard balance 1 min ea 1 min ea 2 min ea 2 min ea 2 min ea 2 min ea       Fwd step up/down NV 6"  2x10 6"  2x10 6"  3x10 6" 3x10 6"  3x10                    Modalities             CP post prn

## 2020-02-04 ENCOUNTER — OFFICE VISIT (OUTPATIENT)
Dept: PHYSICAL THERAPY | Facility: CLINIC | Age: 65
End: 2020-02-04
Payer: COMMERCIAL

## 2020-02-04 DIAGNOSIS — Z96.652 HISTORY OF TOTAL LEFT KNEE REPLACEMENT: ICD-10-CM

## 2020-02-04 DIAGNOSIS — M17.12 PRIMARY OSTEOARTHRITIS OF LEFT KNEE: Primary | ICD-10-CM

## 2020-02-04 PROCEDURE — 97112 NEUROMUSCULAR REEDUCATION: CPT | Performed by: PHYSICAL THERAPIST

## 2020-02-04 PROCEDURE — 97140 MANUAL THERAPY 1/> REGIONS: CPT | Performed by: PHYSICAL THERAPIST

## 2020-02-04 PROCEDURE — 97110 THERAPEUTIC EXERCISES: CPT | Performed by: PHYSICAL THERAPIST

## 2020-02-04 NOTE — PROGRESS NOTES
Daily Note     Today's date: 2020  Patient name: Mindy Mcguire  : 1955  MRN: [id number]  Referring provider: Margaret Chairez MD  Dx:   Encounter Diagnosis     ICD-10-CM    1  Primary osteoarthritis of left knee M17 12    2  History of total left knee replacement Z96 652                   Subjective: Patient stated mild stiffness prior to treatment session  Objective: See treatment diary below  Assessment: Patient performed exercises without c/o pain; moderate pain with manual knee flexion PROM  Plan: Continue per plan of care  Progress treatment as tolerated  Plans to transition to fitness program offered at clinic when discharged  Precautions: none    Daily Treatment Diary  Manuals       L knee PROM flex/extension AN  EH RK AN  KK                                Exercise Diary              Bike 10 min 10 min 10 min 10 min 10 min 7 min 10 min      Leg press flexion stretch 10 sec  x10 D/C                        Prone quad stretch 30"x3 30"x3 30"x3 30"x3 30"x3 30"x3 30"x3      Glute bridge With add 2x15 With add  2x15 With add  2x15 W/ ADD  2x15 With add 2x15 NV With add 2x15      SLR flexion 2 5# 2x15 2 5#  2x15 2 5#  2x15 2 5#  2x15 2 5# 2x15 NV 2 5# 2x15                   LAQ 3# 2x15 3#  2x15 3#  2x15 3#  2x15 3# 2x15 NV 3# 2x15      TB HS curls GTB  2x15 Green  2x15 Green  2x15 GTB  2x15 GTB 2x15 NV GTB 2x15                   Leg press S/L -seat 7 65# 3x10 65#  3x10 65#  3x10 75#  3x10 75# 3x10 75#  3x10 75#  3x10      Multihip abd B/L 25# 3x10 25#  3x10 25#  3x10 25#  3x10 25# 3x10 25#  3x10 25#  3x10      Multihip flex B/L 25# 3x10 25#  3x10 25#  3x10 25#  3x10 25# 3x10 25#  3x10 25#  3x10                   Rockerboard balance 1 min ea 1 min ea 2 min ea 2 min ea 2 min ea 2 min ea 2 min ea        Fwd step up/down NV 6"  2x10 6"  2x10 6"  3x10 6" 3x10 6"  3x10 6"  3x10                   Modalities             CP post prn

## 2020-02-07 ENCOUNTER — OFFICE VISIT (OUTPATIENT)
Dept: PHYSICAL THERAPY | Facility: CLINIC | Age: 65
End: 2020-02-07
Payer: COMMERCIAL

## 2020-02-07 DIAGNOSIS — Z96.652 HISTORY OF TOTAL LEFT KNEE REPLACEMENT: ICD-10-CM

## 2020-02-07 DIAGNOSIS — M17.12 PRIMARY OSTEOARTHRITIS OF LEFT KNEE: Primary | ICD-10-CM

## 2020-02-07 PROCEDURE — 97140 MANUAL THERAPY 1/> REGIONS: CPT

## 2020-02-07 PROCEDURE — 97110 THERAPEUTIC EXERCISES: CPT

## 2020-02-07 PROCEDURE — 97112 NEUROMUSCULAR REEDUCATION: CPT

## 2020-02-07 NOTE — PROGRESS NOTES
Daily Note     Today's date: 2020  Patient name: Mechelle Hermosillo  : 1955  MRN: [id number]  Referring provider: Willard Dela Cruz MD  Dx:   Encounter Diagnosis     ICD-10-CM    1  Primary osteoarthritis of left knee M17 12    2  History of total left knee replacement [id number] [id number]                   Subjective: Patient reported stiffness continues in L knee, at times still having difficulty ascending stairs  Objective: See treatment diary below  Assessment: Continues to fatigue with strengthening program  Swelling prominent on L compared to R knee  With functional limitations that are still present, will benefit from progressions as able to help decrease deviations with stair climbing  Plan: Continue per plan of care  Progress treatment as tolerated  Plans to transition to fitness program offered at clinic when discharged          Precautions: none    Daily Treatment Diary  Manuals      L knee PROM flex/extension AN  EH RK AN EH KK EH                               Exercise Diary              Bike 10 min 10 min 10 min 10 min 10 min 7 min 10 min 10 min  timer     Leg press flexion stretch 10 sec  x10 D/C                        Prone quad stretch 30"x3 30"x3 30"x3 30"x3 30"x3 30"x3 30"x3 30"x3     Glute bridge With add 2x15 With add  2x15 With add  2x15 W/ ADD  2x15 With add 2x15 NV With add 2x15 With add  2x15     SLR flexion 2 5# 2x15 2 5#  2x15 2 5#  2x15 2 5#  2x15 2 5# 2x15 NV 2 5# 2x15 2 5#  2x15                  LAQ 3# 2x15 3#  2x15 3#  2x15 3#  2x15 3# 2x15 NV 3# 2x15 3#  2x15     TB HS curls GTB  2x15 Green  2x15 Green  2x15 GTB  2x15 GTB 2x15 NV GTB 2x15 Green  2x15 Blue                 Leg press S/L -seat 6 65# 3x10 65#  3x10 65#  3x10 75#  3x10 75# 3x10 75#  3x10 75#  3x10 75#  3x10     Multihip abd B/L 25# 3x10 25#  3x10 25#  3x10 25#  3x10 25# 3x10 25#  3x10 25#  3x10 25#  3x10     Multihip flex B/L 25# 3x10 25#  3x10 25#  3x10 25#  3x10 25# 3x10 25#  3x10 25#  3x10 25#  3x10                  Rockerboard balance 1 min ea 1 min ea 2 min ea 2 min ea 2 min ea 2 min ea 2 min ea   2 min ea     Fwd step up/down NV 6"  2x10 6"  2x10 6"  3x10 6" 3x10 6"  3x10 6"  3x10 6"  3x10                  Modalities             CP post prn

## 2020-02-11 ENCOUNTER — OFFICE VISIT (OUTPATIENT)
Dept: PHYSICAL THERAPY | Facility: CLINIC | Age: 65
End: 2020-02-11
Payer: COMMERCIAL

## 2020-02-11 DIAGNOSIS — Z96.652 HISTORY OF TOTAL LEFT KNEE REPLACEMENT: ICD-10-CM

## 2020-02-11 DIAGNOSIS — M17.12 PRIMARY OSTEOARTHRITIS OF LEFT KNEE: Primary | ICD-10-CM

## 2020-02-11 PROCEDURE — 97110 THERAPEUTIC EXERCISES: CPT

## 2020-02-11 PROCEDURE — 97112 NEUROMUSCULAR REEDUCATION: CPT

## 2020-02-11 NOTE — PROGRESS NOTES
Daily Note     Today's date: 2020  Patient name: Wilman Royal  : 1955  MRN: [id number]  Referring provider: Soren Rascon MD  Dx:   Encounter Diagnosis     ICD-10-CM    1  Primary osteoarthritis of left knee M17 12    2  History of total left knee replacement [id number] 652                   Subjective: Patient reported increased soreness L knee after she had her  try to stretch her knee  Objective: See treatment diary below  Assessment: Encouraged to complete full revolutions on recumbent bike  Still with some end range knee flex tightness  Fatigues with current hip and knee strengthening exercises  Plan: Continue per plan of care  Progress treatment as tolerated  Plans to transition to fitness program offered at clinic when discharged          Precautions: none    Daily Treatment Diary  Manuals     L knee PROM flex/extension AN [facility] RK AN [facility]                              Exercise Diary              Bike 10 min 10 min 10 min 10 min 10 min 7 min 10 min 10 min  timer 10 min timer    Leg press flexion stretch 10 sec  x10 D/C                        Prone quad stretch 30"x3 30"x3 30"x3 30"x3 30"x3 30"x3 30"x3 30"x3 30"x3    Glute bridge With add 2x15 With add  2x15 With add  2x15 W/ ADD  2x15 With add 2x15 NV With add 2x15 With add  2x15 With add  2x15    SLR flexion 2 5# 2x15 2 5#  2x15 2 5#  2x15 2 5#  2x15 2 5# 2x15 NV 2 5# 2x15 2 5#  2x15 2 5#  2x15                 LAQ 3# 2x15 3#  2x15 3#  2x15 3#  2x15 3# 2x15 NV 3# 2x15 3#  2x15 3#  2x15    TB HS curls GTB  2x15 Green  2x15 Green  2x15 GTB  2x15 GTB 2x15 NV GTB 2x15 Green  2x15 Blue  2x15                 Leg press S/L -seat 6 65# 3x10 65#  3x10 65#  3x10 75#  3x10 75# 3x10 75#  3x10 75#  3x10 75#  3x10 75#  3x10    Multihip abd B/L 25# 3x10 25#  3x10 25#  3x10 25#  3x10 25# 3x10 25#  3x10 25#  3x10 25#  3x10 25#  3x10    Multihip flex B/L 25# 3x10 25#  3x10 25#  3x10 25#  3x10 25# 3x10 25#  3x10 25#  3x10 25#  3x10 25#  3x10                 Rockerboard balance 1 min ea 1 min ea 2 min ea 2 min ea 2 min ea 2 min ea 2 min ea   2 min ea 2 min ea    Fwd step up/down NV 6"  2x10 6"  2x10 6"  3x10 6" 3x10 6"  3x10 6"  3x10 6"  3x10 6"  3x10                 Modalities             CP post prn

## 2020-02-13 ENCOUNTER — APPOINTMENT (OUTPATIENT)
Dept: PHYSICAL THERAPY | Facility: CLINIC | Age: 65
End: 2020-02-13
Payer: COMMERCIAL

## 2020-02-14 ENCOUNTER — OFFICE VISIT (OUTPATIENT)
Dept: PHYSICAL THERAPY | Facility: CLINIC | Age: 65
End: 2020-02-14
Payer: COMMERCIAL

## 2020-02-14 DIAGNOSIS — M17.12 PRIMARY OSTEOARTHRITIS OF LEFT KNEE: Primary | ICD-10-CM

## 2020-02-14 DIAGNOSIS — Z96.652 HISTORY OF TOTAL LEFT KNEE REPLACEMENT: ICD-10-CM

## 2020-02-14 PROCEDURE — 97110 THERAPEUTIC EXERCISES: CPT

## 2020-02-14 PROCEDURE — 97140 MANUAL THERAPY 1/> REGIONS: CPT

## 2020-02-14 PROCEDURE — 97112 NEUROMUSCULAR REEDUCATION: CPT

## 2020-02-14 NOTE — PROGRESS NOTES
Daily Note     Today's date: 2020  Patient name: China Clancy  : 1955  MRN: [id number]  Referring provider: Jake Denson MD  Dx:   Encounter Diagnosis     ICD-10-CM    1  Primary osteoarthritis of left knee M17 12    2  History of total left knee replacement Y20 [id number]                 DOS: 19      Subjective: Patient reported stiffness remains in L knee  Has been having hamstring cramps on and off on the L, mostly at night  Also at times having general discomfort in knee  Objective: See treatment diary below  Assessment: Improved knee flex mobility post manual, near equal to R feeling as though swelling is still limiting her  Ongoing muscle fatigue and weakness through program  With continued functional deficits, will benefit from progressions as able  Plan: Continue per plan of care  Progress treatment as tolerated  Plans to transition to fitness program offered at clinic when discharged          Precautions: none    Daily Treatment Diary  Manuals    L knee PROM flex/extension AN [facility] RK [facility]                             Exercise Diary              Bike 10 min 10 min 10 min 10 min 10 min 7 min 10 min 10 min  timer 10 min timer 10 min  timer   Leg press flexion stretch 10 sec  x10 D/C                        Prone quad stretch 30"x3 30"x3 30"x3 30"x3 30"x3 30"x3 30"x3 30"x3 30"x3 30"x3   Glute bridge with add With add 2x15 With add  2x15 With add  2x15 W/ ADD  2x15 With add 2x15 NV With add 2x15 With add  2x15 With add  2x15 With add  2x15   SLR flexion 2 5# 2x15 2 5#  2x15 2 5#  2x15 2 5#  2x15 2 5# 2x15 NV 2 5# 2x15 2 5#  2x15 2 5#  2x15 2 5#  2x15                LAQ 3# 2x15 3#  2x15 3#  2x15 3#  2x15 3# 2x15 NV 3# 2x15 3#  2x15 3#  2x15 3#  2x15   TB HS curls GTB  2x15 Green  2x15 Green  2x15 GTB  2x15 GTB 2x15 NV GTB 2x15 Green  2x15 Blue  2x15 Blue  2x15                Leg press S/L -seat 6 65# 3x10 65#  3x10 65#  3x10 75#  3x10 75# 3x10 75#  3x10 75#  3x10 75#  3x10 75#  3x10 75#  3x10   Multihip abd B/L 25# 3x10 25#  3x10 25#  3x10 25#  3x10 25# 3x10 25#  3x10 25#  3x10 25#  3x10 25#  3x10 25#  3x10   Multihip flex B/L 25# 3x10 25#  3x10 25#  3x10 25#  3x10 25# 3x10 25#  3x10 25#  3x10 25#  3x10 25#  3x10 25#  3x10                Rockerboard balance 1 min ea 1 min ea 2 min ea 2 min ea 2 min ea 2 min ea 2 min ea   2 min ea 2 min ea 2 min  ea   Fwd step up and over NV 6"  2x10 6"  2x10 6"  3x10 6" 3x10 6"  3x10 6"  3x10 6"  3x10 6"  3x10 6"  3x10                Modalities             CP post prn

## 2020-02-18 ENCOUNTER — OFFICE VISIT (OUTPATIENT)
Dept: PHYSICAL THERAPY | Facility: CLINIC | Age: 65
End: 2020-02-18
Payer: COMMERCIAL

## 2020-02-18 DIAGNOSIS — M17.12 PRIMARY OSTEOARTHRITIS OF LEFT KNEE: Primary | ICD-10-CM

## 2020-02-18 DIAGNOSIS — Z96.652 HISTORY OF TOTAL LEFT KNEE REPLACEMENT: ICD-10-CM

## 2020-02-18 PROCEDURE — 97112 NEUROMUSCULAR REEDUCATION: CPT

## 2020-02-18 PROCEDURE — 97140 MANUAL THERAPY 1/> REGIONS: CPT

## 2020-02-18 PROCEDURE — 97110 THERAPEUTIC EXERCISES: CPT

## 2020-02-18 NOTE — PROGRESS NOTES
Daily Note     Today's date: 2020  Patient name: Tia Chavez  : 1955  MRN: [id number]  Referring provider: Sumaya Cardenas MD  Dx:   Encounter Diagnosis     ICD-10-CM    1  Primary osteoarthritis of left knee M17 12    2  History of total left knee replacement [id number]                 DOS: 19      Subjective: Patient feels the numbness in her L knee is improving however in turn has been having more pain  Objective: See treatment diary below  Assessment: Initially unable to complete full revolutions on recumbent bike, only the last few min was she able to  Challenged with increasing height of step, lacking eccentric control on step down  Swelling seemed increased today  Knee felt sore after manual stretching  Plan: Continue per plan of care  Progress treatment as tolerated  Will plan to finish out the month of Feb and transition to fitness program offered at clinic          Precautions: none    Daily Treatment Diary  Manuals             L knee PROM flex/extension [facility]                                      Exercise Diary              Bike 10 min  timer            Leg press flexion stretch D/C                         Prone quad stretch 30"x3            Glute bridge with add 2x15            SLR flexion 2 5#  2x15 3#                        LAQ 3#  2x15            TB HS curls Blue  2x15                         Leg press S/L -seat 6 75#  3x10            Multihip abd B/L 25#  3x10            Multihip flex B/L 25#  3x10                         Rockerboard balance 2 min  ea            Fwd step up and over 8"  3x10                         Modalities             CP post prn

## 2020-02-20 ENCOUNTER — EVALUATION (OUTPATIENT)
Dept: PHYSICAL THERAPY | Facility: CLINIC | Age: 65
End: 2020-02-20
Payer: COMMERCIAL

## 2020-02-20 DIAGNOSIS — Z96.652 HISTORY OF TOTAL LEFT KNEE REPLACEMENT: ICD-10-CM

## 2020-02-20 DIAGNOSIS — M17.12 PRIMARY OSTEOARTHRITIS OF LEFT KNEE: Primary | ICD-10-CM

## 2020-02-20 PROCEDURE — 97112 NEUROMUSCULAR REEDUCATION: CPT

## 2020-02-20 PROCEDURE — 97110 THERAPEUTIC EXERCISES: CPT

## 2020-02-20 NOTE — PROGRESS NOTES
Daily Note     Today's date: 2020  Patient name: Jocelyn Fish  : 1955  MRN: [id number]  Referring provider: Nikita Shipman MD  Dx:   Encounter Diagnosis     ICD-10-CM    1  Primary osteoarthritis of left knee M17 12    2  History of total left knee replacement S23 [id number]                 DOS: 19      Subjective: Patient reported having continued stiffness in L knee  Going up stairs is better, down still challenging  Still has some numbness in areas over and around knee but has improved  Objective: See treatment diary below  Assessment: She displays continued muscle fatigue during strengthening program  Some discomfort at end range knee flex during PROM with swelling limiting her end range  Consider further balance progressions like SL stance, tandem amb, and slow march to address hip and knee weakness / instability  Plan: Continue per plan of care  Progress treatment as tolerated  Will plan to finish out the month of Feb and transition to fitness program offered at clinic          Precautions: none    Daily Treatment Diary  Manuals            L knee PROM flex/extension [facility]                                     Exercise Diary              Bike 10 min  timer 10 min timer           Leg press flexion stretch D/C                         Prone quad stretch 30"x3 30"x3           Glute bridge with add 2x15 2x15           SLR flexion 2 5#  2x15 3#  2x15                        LAQ 3#  2x15 3#  2x15           TB HS curls Blue  2x15 Blue  2x15                        Leg press S/L -seat 6 75#  3x10 75#  3x10           Multihip abd B/L 25#  3x10 25#  3x10           Multihip flex B/L 25#  3x10 25#  3x10                        Rockerboard balance 2 min  ea 2 min ea           Fwd step up and over 8"  3x10 8"  3x10                        Modalities             CP post prn

## 2020-02-24 ENCOUNTER — TRANSCRIBE ORDERS (OUTPATIENT)
Dept: PHYSICAL THERAPY | Facility: CLINIC | Age: 65
End: 2020-02-24

## 2020-02-24 ENCOUNTER — EVALUATION (OUTPATIENT)
Dept: PHYSICAL THERAPY | Facility: CLINIC | Age: 65
End: 2020-02-24
Payer: COMMERCIAL

## 2020-02-24 DIAGNOSIS — M17.12 PRIMARY OSTEOARTHRITIS OF LEFT KNEE: Primary | ICD-10-CM

## 2020-02-24 DIAGNOSIS — Z96.652 HISTORY OF TOTAL LEFT KNEE REPLACEMENT: ICD-10-CM

## 2020-02-24 PROCEDURE — 97112 NEUROMUSCULAR REEDUCATION: CPT

## 2020-02-24 PROCEDURE — 97140 MANUAL THERAPY 1/> REGIONS: CPT

## 2020-02-24 PROCEDURE — 97110 THERAPEUTIC EXERCISES: CPT

## 2020-02-24 NOTE — LETTER
2020    [facility] [address] [facility]    Patient: Tia Cahvez   YOB: 1955   Date of Visit: 2020     Encounter Diagnosis     ICD-10-CM    1  Primary osteoarthritis of left knee M17 12    2  History of total left knee replacement N28 [id number]        Dear Dr Kacey Zhong:    Thank you for your recent referral of Tia Chavez  Please review the attached evaluation summary from Chana's recent visit  Please verify that you agree with the plan of care by signing the attached order  If you have any questions or concerns, please do not hesitate to call  I sincerely appreciate the opportunity to share in the care of one of your patients and hope to have another opportunity to work with you in the near future  Sincerely,    Roseann Gonzalez, PT      Referring Provider:      I certify that I have read the below Plan of Care and certify the need for these services furnished under this plan of treatment while under my care  Marlys Edmond MD  [facility] 31: [phone]          PT Re-Evaluation     Today's date: 2020  Patient name: Tia Chavez  : 1955  MRN: [id number]  Referring provider: Sumaya Cardenas MD  Dx:   Encounter Diagnosis     ICD-10-CM    1  Primary osteoarthritis of left knee M17 12    2  History of total left knee replacement Z96 652        Start Time: 0800  Stop Time: 0845  Total time in clinic (min): 45 minutes    Assessment/Plan   Vicenta Martinez has been an active participant in PT since 21 to 22 s/p LTKA  During this time she was treated with therapeutic exercise, neuromuscular reeducation, and manual therapy  She continues to demonstrate good progress since last reevaluation, as evidenced by improvements in knee flexion ROM and hip ABD strength    Patient would benefit from one additional PT session to update POC to ensure continued progression toward functional independence  Goals  STG  1) L knee ROM will improve to at least 100 degrees in 4 weeks  -Met  2) L knee strength will improve 1/2 grade in 4 weeks  -Met  3) B/L hip strength will improve 1/2 grade in 4 weeks  -Met  LTG  1) Patient is independent in HEP  -Met  2) Patient will ascend/descend one flight of stairs independently with no increased pain in 8 weeks  -Met  3) Patient will ambulate independently and safely with no AD for 200 feet on level surface in 8 weeks  -Met  4) L knee ROM will be at least 115 degrees in 8 weeks  -Met      Planned therapy interventions: joint mobilization, manual therapy, muscle pump exercises, neuromuscular re-education, patient education, strengthening, stretching, therapeutic activities, therapeutic exercise, home exercise program, gait training, flexibility, balance/weight bearing training, massage, abdominal trunk stabilization, body mechanics training, postural training and functional ROM exercises  Duration in visits: 2  Duration in weeks:  1  Treatment plan discussed with: patient    Subjective  Patient reports that when she is fatigued, she notices some compensation with stair navigation but is still able to perform with step-to pattern  In addition, she will experience some pain when on her feet for prolonged periods of time  However, patient reports minimal to no functional deficits otherwise        Objective     Tenderness   Left Knee   Patient denies TTP       Neurological Testing  Sensation  Knee   Left Knee   Intact: light touch     Active Range of Motion   Left Knee   Flexion: 1 20 degrees   Extension: 0 degrees      Passive Range of Motion   Left Knee   Flexion: 1 23 degrees   Extension: 0 degrees      Strength/Myotome Testing  Left Hip   Planes of Motion   Flexion: 5  Extension: 5  Abduction:  5  Adduction: 5     Isolated Muscles   Gluteus coty: 5  Gluteus medius:  5     Left Knee   Flexion: 5  Extension: 5  Quadriceps contraction: good       Precautions: none    Daily Treatment Diary  Manuals 2/18 2/20 2/24          L knee PROM flex/extension [facility] EH JAB                                    Exercise Diary              Bike 10 min  timer 10 min timer 10 min timer          Leg press flexion stretch D/C                         Prone quad stretch 30"x3 30"x3 30"x3          Glute bridge with add 2x15 2x15 2x15          SLR flexion 2 5#  2x15 3#  2x15 3# 2x15                       LAQ 3#  2x15 3#  2x15 3# 2x15          TB HS curls Blue  2x15 Blue  2x15 Blue 2x152                        Leg press S/L -seat 6 75#  3x10 75#  3x10 75# 3x10          Multihip abd B/L 25#  3x10 25#  3x10 25# 3x10          Multihip flex B/L 25#  3x10 25#  3x10 25# 3x10                       Rockerboard balance 2 min  ea 2 min ea 2 min ea          Fwd step up and over 8"  3x10 8"  3x10 8" 3x10                       Modalities             CP post prn

## 2020-02-24 NOTE — PROGRESS NOTES
PT Re-Evaluation     Today's date: 2020  Patient name: Mary Lou Conroy  : 1955  MRN: [id number]  Referring provider: Radha James MD  Dx:   Encounter Diagnosis     ICD-10-CM    1  Primary osteoarthritis of left knee M17 12    2  History of total left knee replacement [id number] [id number]        Start Time: 0800  Stop Time: 0845  Total time in clinic (min): 45 minutes    Assessment/Plan   Kuldeep Fagan has been an active participant in PT since 21 to 22 s/p LTKA  During this time she was treated with therapeutic exercise, neuromuscular reeducation, and manual therapy  She continues to demonstrate good progress since last reevaluation, as evidenced by improvements in knee flexion ROM and hip ABD strength  Patient would benefit from one additional PT session to update POC to ensure continued progression toward functional independence  Goals  STG  1) L knee ROM will improve to at least 100 degrees in 4 weeks  -Met  2) L knee strength will improve 1/2 grade in 4 weeks  -Met  3) B/L hip strength will improve 1/2 grade in 4 weeks  -Met  LTG  1) Patient is independent in HEP  -Met  2) Patient will ascend/descend one flight of stairs independently with no increased pain in 8 weeks  -Met  3) Patient will ambulate independently and safely with no AD for 200 feet on level surface in 8 weeks  -Met  4) L knee ROM will be at least 115 degrees in 8 weeks   -Met      Planned therapy interventions: joint mobilization, manual therapy, muscle pump exercises, neuromuscular re-education, patient education, strengthening, stretching, therapeutic activities, therapeutic exercise, home exercise program, gait training, flexibility, balance/weight bearing training, massage, abdominal trunk stabilization, body mechanics training, postural training and functional ROM exercises  Duration in visits: 2  Duration in weeks: 1  Treatment plan discussed with: patient    Subjective  Patient reports that when she is fatigued, she notices some compensation with stair navigation but is still able to perform with step-to pattern  In addition, she will experience some pain when on her feet for prolonged periods of time  However, patient reports minimal to no functional deficits otherwise        Objective     Tenderness   Left Knee   Patient denies TTP       Neurological Testing  Sensation  Knee   Left Knee   Intact: light touch     Active Range of Motion   Left Knee   Flexion: 120 degrees   Extension: 0 degrees      Passive Range of Motion   Left Knee   Flexion: 123 degrees   Extension: 0 degrees      Strength/Myotome Testing  Left Hip   Planes of Motion   Flexion: 5  Extension: 5  Abduction: 5  Adduction: 5     Isolated Muscles   Gluteus coty: 5  Gluteus medius: 5     Left Knee   Flexion: 5  Extension: 5  Quadriceps contraction: good       Precautions: none    Daily Treatment Diary  Manuals 2/18 2/20 2/24          L knee PROM flex/extension [facility] JAB                                    Exercise Diary              Bike 10 min  timer 10 min timer 10 min timer          Leg press flexion stretch D/C                         Prone quad stretch 30"x3 30"x3 30"x3          Glute bridge with add 2x15 2x15 2x15          SLR flexion 2 5#  2x15 3#  2x15 3# 2x15                       LAQ 3#  2x15 3#  2x15 3# 2x15          TB HS curls Blue  2x15 Blue  2x15 Blue 2x152                        Leg press S/L -seat 6 75#  3x10 75#  3x10 75# 3x10          Multihip abd B/L 25#  3x10 25#  3x10 25# 3x10          Multihip flex B/L 25#  3x10 25#  3x10 25# 3x10                       Rockerboard balance 2 min  ea 2 min ea 2 min ea          Fwd step up and over 8"  3x10 8"  3x10 8" 3x10                       Modalities             CP post prn

## 2020-02-27 ENCOUNTER — OFFICE VISIT (OUTPATIENT)
Dept: PHYSICAL THERAPY | Facility: CLINIC | Age: 65
End: 2020-02-27
Payer: COMMERCIAL

## 2020-02-27 DIAGNOSIS — M17.12 PRIMARY OSTEOARTHRITIS OF LEFT KNEE: Primary | ICD-10-CM

## 2020-02-27 DIAGNOSIS — Z96.652 HISTORY OF TOTAL LEFT KNEE REPLACEMENT: ICD-10-CM

## 2020-02-27 PROCEDURE — 97110 THERAPEUTIC EXERCISES: CPT

## 2020-02-27 PROCEDURE — 97140 MANUAL THERAPY 1/> REGIONS: CPT

## 2020-02-27 PROCEDURE — 97112 NEUROMUSCULAR REEDUCATION: CPT

## 2020-02-27 NOTE — PROGRESS NOTES
Daily Note     Today's date: 2020  Patient name: Alessandro Austin  : 1955  MRN: [id number]  Referring provider: Linda Steve MD  Dx:   Encounter Diagnosis     ICD-10-CM    1  Primary osteoarthritis of left knee M17 12    2  History of total left knee replacement Z96 652                   Subjective: Patient feel her L knee has made progress  Stiffness and some discomfort at times but overall has improved  Objective: See treatment diary below  Assessment: Has good understanding of program, confident in her ability to keep form through exercises  Some end range knee flex tightness with continued swelling  Plan: Will discharge to fitness program offered at clinic           Precautions: none    Daily Treatment Diary  Manuals          L knee PROM flex/extension [facility] EH JAB                                    Exercise Diary              Bike 10 min  timer 10 min timer 10 min timer 10 min timer         Leg press flexion stretch D/C                         Prone quad stretch 30"x3 30"x3 30"x3 30"x3         Glute bridge with add 2x15 2x15 2x15 2x15         SLR flexion 2 5#  2x15 3#  2x15 3# 2x15 3#  2x15                      LAQ 3#  2x15 3#  2x15 3# 2x15 3#  2x15         TB HS curls Blue  2x15 Blue  2x15 Blue 2x15  Blue  2x15                        Leg press S/L -seat 6 75#  3x10 75#  3x10 75# 3x10 75#  3x10         Multihip abd B/L 25#  3x10 25#  3x10 25# 3x10 25#  3x10         Multihip flex B/L 25#  3x10 25#  3x10 25# 3x10 25#  3x10                      Rockerboard balance 2 min  ea 2 min ea 2 min ea 2 min ea         Fwd step up and over 8"  3x10 8"  3x10 8" 3x10 8"  3x10                      Modalities             CP post prn

## 2021-09-22 RX ORDER — ESTRADIOL 10 UG/1
1 INSERT VAGINAL DAILY
COMMUNITY

## 2021-09-29 ENCOUNTER — ANESTHESIA EVENT (OUTPATIENT)
Dept: PERIOP | Facility: HOSPITAL | Age: 66
End: 2021-09-29
Payer: COMMERCIAL

## 2021-09-30 ENCOUNTER — ANESTHESIA (OUTPATIENT)
Dept: PERIOP | Facility: HOSPITAL | Age: 66
End: 2021-09-30
Payer: COMMERCIAL

## 2021-09-30 ENCOUNTER — HOSPITAL ENCOUNTER (OUTPATIENT)
Facility: HOSPITAL | Age: 66
Setting detail: OUTPATIENT SURGERY
Discharge: HOME | End: 2021-10-01
Attending: OBSTETRICS & GYNECOLOGY | Admitting: OBSTETRICS & GYNECOLOGY
Payer: COMMERCIAL

## 2021-09-30 DIAGNOSIS — N81.11 CYSTOCELE, MIDLINE: ICD-10-CM

## 2021-09-30 DIAGNOSIS — N81.2 INCOMPLETE UTEROVAGINAL PROLAPSE: Primary | ICD-10-CM

## 2021-09-30 DIAGNOSIS — N36.41 HYPERMOBILITY OF URETHRA: ICD-10-CM

## 2021-09-30 DIAGNOSIS — N39.3 STRESS INCONTINENCE (FEMALE) (MALE): ICD-10-CM

## 2021-09-30 PROCEDURE — C1771 REP DEV, URINARY, W/SLING: HCPCS | Performed by: OBSTETRICS & GYNECOLOGY

## 2021-09-30 PROCEDURE — C2631 REP DEV, URINARY, W/O SLING: HCPCS | Performed by: OBSTETRICS & GYNECOLOGY

## 2021-09-30 DEVICE — SLING TVT ABBREVO MINI: Type: IMPLANTABLE DEVICE | Site: BLADDER | Status: FUNCTIONAL

## 2021-09-30 RX ORDER — SODIUM CHLORIDE 9 MG/ML
125 INJECTION, SOLUTION INTRAVENOUS CONTINUOUS
Status: DISCONTINUED | OUTPATIENT
Start: 2021-09-30 | End: 2021-09-30

## 2021-09-30 RX ORDER — HYDROMORPHONE HCL/PF 1 MG/ML
0.5 SYRINGE (ML) INJECTION
Status: DISCONTINUED | OUTPATIENT
Start: 2021-09-30 | End: 2021-09-30 | Stop reason: HOSPADM

## 2021-09-30 RX ORDER — SENNOSIDES 8.6 MG
650 CAPSULE ORAL EVERY 6 HOURS PRN
Qty: 30 TABLET | Refills: 0
Start: 2021-09-30

## 2021-09-30 RX ORDER — IBUPROFEN 600 MG/1
600 TABLET ORAL EVERY 6 HOURS PRN
Qty: 30 TABLET | Refills: 0
Start: 2021-09-30

## 2021-09-30 RX ORDER — FENTANYL CITRATE/PF 50 MCG/ML
50 SYRINGE (ML) INJECTION
Status: DISCONTINUED | OUTPATIENT
Start: 2021-09-30 | End: 2021-09-30 | Stop reason: HOSPADM

## 2021-09-30 RX ORDER — IBUPROFEN 600 MG/1
600 TABLET ORAL EVERY 6 HOURS SCHEDULED
Status: DISCONTINUED | OUTPATIENT
Start: 2021-09-30 | End: 2021-10-01 | Stop reason: HOSPADM

## 2021-09-30 RX ORDER — ACETAMINOPHEN 325 MG/1
975 TABLET ORAL EVERY 6 HOURS SCHEDULED
Status: DISCONTINUED | OUTPATIENT
Start: 2021-10-01 | End: 2021-10-01 | Stop reason: HOSPADM

## 2021-09-30 RX ORDER — DOCUSATE SODIUM 100 MG/1
100 CAPSULE, LIQUID FILLED ORAL 2 TIMES DAILY
Status: DISCONTINUED | OUTPATIENT
Start: 2021-09-30 | End: 2021-10-01 | Stop reason: HOSPADM

## 2021-09-30 RX ORDER — FENTANYL CITRATE 50 UG/ML
INJECTION, SOLUTION INTRAMUSCULAR; INTRAVENOUS
Status: COMPLETED | OUTPATIENT
Start: 2021-09-30 | End: 2021-09-30

## 2021-09-30 RX ORDER — PROPOFOL 10 MG/ML
INJECTION, EMULSION INTRAVENOUS CONTINUOUS PRN
Status: DISCONTINUED | OUTPATIENT
Start: 2021-09-30 | End: 2021-09-30

## 2021-09-30 RX ORDER — ONDANSETRON 2 MG/ML
4 INJECTION INTRAMUSCULAR; INTRAVENOUS EVERY 6 HOURS PRN
Status: DISCONTINUED | OUTPATIENT
Start: 2021-09-30 | End: 2021-10-01 | Stop reason: HOSPADM

## 2021-09-30 RX ORDER — IBUPROFEN 600 MG/1
600 TABLET ORAL EVERY 6 HOURS PRN
Status: DISCONTINUED | OUTPATIENT
Start: 2021-09-30 | End: 2021-09-30

## 2021-09-30 RX ORDER — CEFAZOLIN SODIUM 2 G/50ML
2000 SOLUTION INTRAVENOUS ONCE
Status: DISCONTINUED | OUTPATIENT
Start: 2021-09-30 | End: 2021-09-30 | Stop reason: HOSPADM

## 2021-09-30 RX ORDER — PHENAZOPYRIDINE HYDROCHLORIDE 200 MG/1
200 TABLET, FILM COATED ORAL ONCE
Status: COMPLETED | OUTPATIENT
Start: 2021-09-30 | End: 2021-09-30

## 2021-09-30 RX ORDER — ONDANSETRON 2 MG/ML
4 INJECTION INTRAMUSCULAR; INTRAVENOUS ONCE AS NEEDED
Status: DISCONTINUED | OUTPATIENT
Start: 2021-09-30 | End: 2021-09-30 | Stop reason: HOSPADM

## 2021-09-30 RX ORDER — MAGNESIUM HYDROXIDE 1200 MG/15ML
LIQUID ORAL AS NEEDED
Status: DISCONTINUED | OUTPATIENT
Start: 2021-09-30 | End: 2021-09-30 | Stop reason: HOSPADM

## 2021-09-30 RX ORDER — DOCUSATE SODIUM 100 MG/1
100 CAPSULE, LIQUID FILLED ORAL 2 TIMES DAILY PRN
Refills: 0
Start: 2021-09-30

## 2021-09-30 RX ORDER — MIDAZOLAM HYDROCHLORIDE 2 MG/2ML
INJECTION, SOLUTION INTRAMUSCULAR; INTRAVENOUS
Status: COMPLETED | OUTPATIENT
Start: 2021-09-30 | End: 2021-09-30

## 2021-09-30 RX ORDER — OXYCODONE HYDROCHLORIDE 5 MG/1
5 TABLET ORAL EVERY 4 HOURS PRN
Status: DISCONTINUED | OUTPATIENT
Start: 2021-09-30 | End: 2021-10-01 | Stop reason: HOSPADM

## 2021-09-30 RX ORDER — ACETAMINOPHEN 325 MG/1
975 TABLET ORAL EVERY 6 HOURS PRN
Status: DISCONTINUED | OUTPATIENT
Start: 2021-09-30 | End: 2021-09-30

## 2021-09-30 RX ORDER — LIDOCAINE HYDROCHLORIDE AND EPINEPHRINE 15; 5 MG/ML; UG/ML
INJECTION, SOLUTION EPIDURAL
Status: COMPLETED | OUTPATIENT
Start: 2021-09-30 | End: 2021-09-30

## 2021-09-30 RX ORDER — LIDOCAINE HYDROCHLORIDE AND EPINEPHRINE 20; 5 MG/ML; UG/ML
INJECTION, SOLUTION EPIDURAL; INFILTRATION; INTRACAUDAL; PERINEURAL AS NEEDED
Status: DISCONTINUED | OUTPATIENT
Start: 2021-09-30 | End: 2021-09-30

## 2021-09-30 RX ORDER — EPHEDRINE SULFATE 50 MG/ML
INJECTION INTRAVENOUS AS NEEDED
Status: DISCONTINUED | OUTPATIENT
Start: 2021-09-30 | End: 2021-09-30

## 2021-09-30 RX ORDER — CEFAZOLIN SODIUM 2 G/50ML
SOLUTION INTRAVENOUS AS NEEDED
Status: DISCONTINUED | OUTPATIENT
Start: 2021-09-30 | End: 2021-09-30

## 2021-09-30 RX ORDER — ONDANSETRON 2 MG/ML
INJECTION INTRAMUSCULAR; INTRAVENOUS AS NEEDED
Status: DISCONTINUED | OUTPATIENT
Start: 2021-09-30 | End: 2021-09-30

## 2021-09-30 RX ADMIN — IBUPROFEN 600 MG: 600 TABLET, FILM COATED ORAL at 17:29

## 2021-09-30 RX ADMIN — PHENAZOPYRIDINE 200 MG: 200 TABLET ORAL at 09:32

## 2021-09-30 RX ADMIN — EPHEDRINE SULFATE 10 MG: 50 INJECTION, SOLUTION INTRAVENOUS at 10:50

## 2021-09-30 RX ADMIN — FENTANYL CITRATE 50 MCG: 50 INJECTION INTRAMUSCULAR; INTRAVENOUS at 13:55

## 2021-09-30 RX ADMIN — DOCUSATE SODIUM 100 MG: 100 CAPSULE ORAL at 21:34

## 2021-09-30 RX ADMIN — SODIUM CHLORIDE: 0.9 INJECTION, SOLUTION INTRAVENOUS at 11:37

## 2021-09-30 RX ADMIN — LIDOCAINE HYDROCHLORIDE AND EPINEPHRINE 3 ML: 15; 5 INJECTION, SOLUTION EPIDURAL at 10:30

## 2021-09-30 RX ADMIN — MIDAZOLAM 2 MG: 1 INJECTION INTRAMUSCULAR; INTRAVENOUS at 10:30

## 2021-09-30 RX ADMIN — EPHEDRINE SULFATE 5 MG: 50 INJECTION, SOLUTION INTRAVENOUS at 12:01

## 2021-09-30 RX ADMIN — SODIUM CHLORIDE: 0.9 INJECTION, SOLUTION INTRAVENOUS at 12:52

## 2021-09-30 RX ADMIN — SODIUM CHLORIDE 125 ML/HR: 0.9 INJECTION, SOLUTION INTRAVENOUS at 10:57

## 2021-09-30 RX ADMIN — ACETAMINOPHEN 975 MG: 325 TABLET, FILM COATED ORAL at 15:30

## 2021-09-30 RX ADMIN — LIDOCAINE HYDROCHLORIDE,EPINEPHRINE BITARTRATE 5 ML: 20; .005 INJECTION, SOLUTION EPIDURAL; INFILTRATION; INTRACAUDAL; PERINEURAL at 11:19

## 2021-09-30 RX ADMIN — PROPOFOL 100 MCG/KG/MIN: 10 INJECTION, EMULSION INTRAVENOUS at 11:27

## 2021-09-30 RX ADMIN — OXYCODONE HYDROCHLORIDE 5 MG: 5 TABLET ORAL at 16:04

## 2021-09-30 RX ADMIN — LIDOCAINE HYDROCHLORIDE,EPINEPHRINE BITARTRATE 5 ML: 20; .005 INJECTION, SOLUTION EPIDURAL; INFILTRATION; INTRACAUDAL; PERINEURAL at 11:25

## 2021-09-30 RX ADMIN — ONDANSETRON 4 MG: 2 INJECTION INTRAMUSCULAR; INTRAVENOUS at 11:16

## 2021-09-30 RX ADMIN — EPHEDRINE SULFATE 10 MG: 50 INJECTION, SOLUTION INTRAVENOUS at 10:52

## 2021-09-30 RX ADMIN — SODIUM CHLORIDE 125 ML/HR: 0.9 INJECTION, SOLUTION INTRAVENOUS at 09:38

## 2021-09-30 RX ADMIN — FENTANYL CITRATE 100 MCG: 50 INJECTION INTRAMUSCULAR; INTRAVENOUS at 10:30

## 2021-09-30 RX ADMIN — ONDANSETRON 4 MG: 2 INJECTION INTRAMUSCULAR; INTRAVENOUS at 16:59

## 2021-09-30 RX ADMIN — FENTANYL CITRATE 50 MCG: 50 INJECTION INTRAMUSCULAR; INTRAVENOUS at 13:44

## 2021-09-30 RX ADMIN — CEFAZOLIN SODIUM 2000 MG: 2 SOLUTION INTRAVENOUS at 13:26

## 2021-09-30 NOTE — ANESTHESIA POSTPROCEDURE EVALUATION
Post-Op Assessment Note    CV Status:  Stable    Pain management: adequate     Mental Status:  Alert and awake   Hydration Status:  Euvolemic   PONV Controlled:  Controlled   Airway Patency:  Patent      Post Op Vitals Reviewed: Yes        Post-op block assessment: catheter intact      No complications documented      BP      Temp      Pulse     Resp      SpO2

## 2021-09-30 NOTE — ANESTHESIA PROCEDURE NOTES
Epidural Block    Patient location during procedure: holding area  Start time: 9/30/2021 10:30 AM  Staffing  Performed: Anesthesiologist   Anesthesiologist: Rumalda Simmonds, DO  Preanesthetic Checklist  Completed: patient identified, IV checked, risks and benefits discussed, surgical consent, monitors and equipment checked, pre-op evaluation and timeout performed  Epidural  Patient position: sitting  Prep: Betadine  Patient monitoring: heart rate, continuous pulse ox and frequent blood pressure checks  Approach: midline  Location: lumbar  Injection technique: LIZET saline  Needle  Needle type: Tuohy   Needle gauge: 18 G  Catheter type: side hole  Catheter size: 20 G  Catheter at skin depth: 9 cm  Catheter securement method: clear occlusive dressing  Test dose: negativelidocaine 1 5% with epinephrine 1:200,000 test dose, 3 mL  midazolam (VERSED) 2 mg/2 mL IV, 2 mg  fentanyl 50 mcg/mL IV, 100 mcg  Assessment  Number of attempts: 1negative aspiration for CSF, negative aspiration for heme and no paresthesia on injection  patient tolerated the procedure well with no immediate complications

## 2021-09-30 NOTE — ANESTHESIA PREPROCEDURE EVALUATION
Procedure:  VE COLPOPEXY (N/A Vagina )  ANTERIOR, POSSIBLE POSTERIOR COLPORRHAPHY (N/A Vagina )  PUBOVAGINAL SLING (N/A Vagina )  CYSTOSCOPY (N/A Bladder)  PERINEOPLASTY (N/A Perineum)    Relevant Problems   No relevant active problems        Physical Exam    Airway    Mallampati score: II  TM Distance: >3 FB  Neck ROM: full     Dental   No notable dental hx     Cardiovascular  Rhythm: regular, Rate: normal, Cardiovascular exam normal    Pulmonary  Pulmonary exam normal Breath sounds clear to auscultation,     Other Findings        Anesthesia Plan  ASA Score- 2     Anesthesia Type- epidural with ASA Monitors  Additional Monitors:   Airway Plan:     Comment: RJ and IVS explained          Plan Factors-    Chart reviewed  Existing labs reviewed  Patient summary reviewed  Patient is not a current smoker  Patient instructed to abstain from smoking on day of procedure  Patient did not smoke on day of surgery  Induction- intravenous  Postoperative Plan-     Informed Consent- Anesthetic plan and risks discussed with patient and spouse

## 2021-10-01 VITALS
TEMPERATURE: 97.8 F | DIASTOLIC BLOOD PRESSURE: 65 MMHG | BODY MASS INDEX: 33.99 KG/M2 | HEIGHT: 61 IN | SYSTOLIC BLOOD PRESSURE: 104 MMHG | OXYGEN SATURATION: 96 % | WEIGHT: 180 LBS | HEART RATE: 53 BPM | RESPIRATION RATE: 18 BRPM

## 2021-10-01 PROCEDURE — 90682 RIV4 VACC RECOMBINANT DNA IM: CPT | Performed by: OBSTETRICS & GYNECOLOGY

## 2021-10-01 PROCEDURE — 90471 IMMUNIZATION ADMIN: CPT | Performed by: OBSTETRICS & GYNECOLOGY

## 2021-10-01 RX ADMIN — ACETAMINOPHEN 975 MG: 325 TABLET, FILM COATED ORAL at 00:21

## 2021-10-01 RX ADMIN — ACETAMINOPHEN 975 MG: 325 TABLET, FILM COATED ORAL at 11:13

## 2021-10-01 RX ADMIN — ACETAMINOPHEN 975 MG: 325 TABLET, FILM COATED ORAL at 06:06

## 2021-10-01 RX ADMIN — IBUPROFEN 600 MG: 600 TABLET, FILM COATED ORAL at 06:06

## 2021-10-01 RX ADMIN — INFLUENZA A VIRUS A/WISCONSIN/588/2019 (H1N1) RECOMBINANT HEMAGGLUTININ ANTIGEN, INFLUENZA A VIRUS A/TASMANIA/503/2020 (H3N2) RECOMBINANT HEMAGGLUTININ ANTIGEN, INFLUENZA B VIRUS B/WASHINGTON/02/2019 RECOMBINANT HEMAGGLUTININ ANTIGEN, AND INFLUENZA B VIRUS B/PHUKET/3073/2013 RECOMBINANT HEMAGGLUTININ ANTIGEN 0.5 ML: 45; 45; 45; 45 INJECTION INTRAMUSCULAR at 13:05

## 2021-10-01 RX ADMIN — OXYCODONE HYDROCHLORIDE 5 MG: 5 TABLET ORAL at 06:10

## 2021-10-01 RX ADMIN — IBUPROFEN 600 MG: 600 TABLET, FILM COATED ORAL at 11:13

## 2021-10-01 RX ADMIN — DOCUSATE SODIUM 100 MG: 100 CAPSULE ORAL at 08:17

## 2021-10-01 RX ADMIN — IBUPROFEN 600 MG: 600 TABLET, FILM COATED ORAL at 00:21

## 2023-06-13 NOTE — PROGRESS NOTES
PT Evaluation     Today's date: 2023  Patient name: Madan Corona  : 1955  MRN: [id number]  Referring provider: Kassandra Davila DO  Dx:   Encounter Diagnosis     ICD-10-CM    1  Cervical spine pain  M54 2       2  Cervical radiculopathy, acute  M54 12       3  Impingement of right shoulder  M25 811                      Assessment  Assessment details: Madna Corona is a 79 y o  female presenting to physical therapy with right sided cervical radiculopathy and presents with pain, decreased range of motion, decreased strength and decreased activity tolerance  Assessment indicates chronic right cervical radiculopathy with acute right shoulder SAPS  Secondary to these impairments, patient has increased difficulty performing ADL's, household chores and  work related tasks  Castelan Dec would benefit from skilled PT to address these issues and maximize function  Thank you for the referral     Impairments: abnormal muscle tone, abnormal or restricted ROM, abnormal movement, activity intolerance, impaired physical strength, lacks appropriate home exercise program and pain with function  Understanding of Dx/Px/POC: excellent  Goals  STG (4 weeks)  1  Patient will be independent with HEP  2  Decrease pain at worst by 2 points on NPRS  3  Increase postural awareness for in quiet sitting  4  Patient will demonstrate ability to sleep at night without pain 50% of the time  LTG (8 weeks)  1  Decrease pain at worst from 4 points on NPRS  2  Increase postural awareness and modify work ergonomic work station for improved posture  3  Patient will demonstrate ability to resume normal overhead lifting without pain  4   Increase FOTO > or equal to expected outcome    Plan  Patient would benefit from: skilled PT  Planned therapy interventions: joint mobilization, manual therapy, neuromuscular re-education, patient education, postural training, strengthening, stretching, therapeutic exercise, home exercise program, functional ROM exercises and ADL training  Frequency: 2x week  Duration in weeks: 8  Treatment plan discussed with: patient        Subjective Evaluation    History of Present Illness  Mechanism of injury: Patient reports to outpatient PT secondary to the onset of right sided cervical spine pain with radiculopathy (about 2 months)  Patient states that the pain began insidiously but notes that was a prior issue  Patient describes the pain as a dull ache/radiating which is worse with lifting, overhead function and improved with no particular positions or activities  She started on Prednisone for relief 2 weeks prior with some reduction in symptoms  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) and notes difficulty with work duties, ADL's and leisure functions as a result  Patients goals for PT are to decrease the pain and return to PLOF  Recurrent probem    Quality of life: good    Pain  Current pain ratin  At best pain ratin  At worst pain rating: 10  Quality: radiating (numbness)  Relieving factors: rest and relaxation  Aggravating factors: lifting and overhead activity  Progression: worsening    Social Support    Employment status: working  Hand dominance: right    Treatments  Previous treatment: medication  Current treatment: medication  Patient Goals  Patient goals for therapy: increased strength, independence with ADLs/IADLs, return to sport/leisure activities, increased motion and decreased pain          Objective     Concurrent Complaints  Positive for disturbed sleep   Negative for night pain    Postural Observations  Seated posture: poor  Standing posture: poor        Neurological Testing     Sensation   Cervical/Thoracic   Left   Intact: light touch    Right   Intact: light touch    Reflexes   Left   Biceps (C5/C6): brisk (3+)  Brachioradialis (C6): brisk (3+)    Right   Biceps (C5/C6): normal (2+)  Brachioradialis (C6): normal (2+)    Active Range of Motion "  Cervical/Thoracic Spine       Cervical    Flexion:  WFL  Extension:  WFL  Left lateral flexion:  WFL  Right lateral flexion:  WFL  Left rotation: 74 degrees WFL  Right rotation: 70 degrees    WFL    Joint Play   Joints within functional limits: C1, C2, C3 and C4     Hypomobile: C5, C6, C7, T1, T2, T3 and T4   Mechanical Assessment    Cervical    Seated Protrusion: repeated movements   Pain location: no change  Seated retraction: repeated movements   Pain location: no change  Seated Flexion:  repeated movements  Pain location: no change  Seated Extension: repeated movements  Pain location: no change  Pain intensity: worse    Thoracic      Lumbar      Strength/Myotome Testing   Cervical Spine     Left   Normal strength    Right   Normal strength    Tests   Cervical   Negative vertical compression and cervical distraction  Left   Negative Spurling's Test A  Right   Positive Spurling's Test A  Right Shoulder   Negative ULTT1, ULTT3 and ULTT4  Lumbar   Negative vertical compression       Additional Tests Details  (+) empty can test  (+) Infraspinatus test  (+) Mackey  (+) painful arc    = (+) SAPS on the right             Precautions: N/A      Manuals 6/14            R posterior capsule JM on R             CTJ JM in supine             T-spine PA UMANG in prone                          Neuro Re-Ed                                                                                                        Ther Ex             UBE w/ postural corrections             Foam roll extensions 3-5'            Seated cervical retractions 2x10 x5\"            Scapular retraction w/ ER - GTB 2x10 x3\"            Sleeper stretch on R             TB posterior glide with ER             TB rows w/ cervical retraction                          Ther Activity                                       Gait Training                                       Modalities                                          "

## 2023-06-14 ENCOUNTER — EVALUATION (OUTPATIENT)
Dept: PHYSICAL THERAPY | Facility: CLINIC | Age: 68
End: 2023-06-14
Payer: COMMERCIAL

## 2023-06-14 DIAGNOSIS — M54.12 CERVICAL RADICULOPATHY, ACUTE: ICD-10-CM

## 2023-06-14 DIAGNOSIS — M54.2 CERVICAL SPINE PAIN: Primary | ICD-10-CM

## 2023-06-14 DIAGNOSIS — M25.811 IMPINGEMENT OF RIGHT SHOULDER: ICD-10-CM

## 2023-06-14 PROCEDURE — 97110 THERAPEUTIC EXERCISES: CPT | Performed by: PHYSICAL THERAPIST

## 2023-06-14 PROCEDURE — 97162 PT EVAL MOD COMPLEX 30 MIN: CPT | Performed by: PHYSICAL THERAPIST

## 2023-06-14 NOTE — LETTER
2023    Kodi Alvarado DO  [address] [place] [address]    Patient: Daniel Baker   YOB: 1955   Date of Visit: 2023     Encounter Diagnosis     ICD-10-CM    1  Cervical spine pain  M54 2       2  Cervical radiculopathy, acute  M54 12       3  Impingement of right shoulder  M25 811           Dear Dr Erlinda Lamb: Thank you for your recent referral of Daniel Baker  Please review the attached evaluation summary from Chnaa's recent visit  Please verify that you agree with the plan of care by signing the attached order  If you have any questions or concerns, please do not hesitate to call  I sincerely appreciate the opportunity to share in the care of one of your patients and hope to have another opportunity to work with you in the near future  Sincerely,    [place], PT      Referring Provider:      I certify that I have read the below Plan of Care and certify the need for these services furnished under this plan of treatment while under my care  Kodi Alvarado DO  [facility] [address]  Via Fax: [phone]          PT Evaluation     Today's date: 2023  Patient name: Daniel Baker  : 1955  MRN: [id number]  Referring provider: Dora Bernard DO  Dx:   Encounter Diagnosis     ICD-10-CM    1  Cervical spine pain  M54 2       2  Cervical radiculopathy, acute  M54 12       3  Impingement of right shoulder  M25 811                      Assessment  Assessment details: Daniel Baker is a 79 y o  female presenting to physical therapy with right sided cervical radiculopathy and presents with pain, decreased range of motion, decreased strength and decreased activity tolerance  Assessment indicates chronic right cervical radiculopathy with acute right shoulder SAPS  Secondary to these impairments, patient has increased difficulty performing ADL's, household chores and  work related tasks    Ronit Dudley would benefit from skilled PT to address these issues and maximize function  Thank you for the referral     Impairments: abnormal muscle tone, abnormal or restricted ROM, abnormal movement, activity intolerance, impaired physical strength, lacks appropriate home exercise program and pain with function  Understanding of Dx/Px/POC: excellent  Goals  STG (4 weeks)  1  Patient will be independent with HEP  2  Decrease pain at worst by 2 points on NPRS  3  Increase postural awareness for in quiet sitting  4  Patient will demonstrate ability to sleep at night without pain 50% of the time  LTG (8 weeks)  1  Decrease pain at worst from 4 points on NPRS  2  Increase postural awareness and modify work ergonomic work station for improved posture  3  Patient will demonstrate ability to resume normal overhead lifting without pain  4  Increase FOTO > or equal to expected outcome    Plan  Patient would benefit from: skilled PT  Planned therapy interventions: joint mobilization, manual therapy, neuromuscular re-education, patient education, postural training, strengthening, stretching, therapeutic exercise, home exercise program, functional ROM exercises and ADL training  Frequency: 2x week  Duration in weeks: 8  Treatment plan discussed with: patient        Subjective Evaluation    History of Present Illness  Mechanism of injury: Patient reports to outpatient PT secondary to the onset of right sided cervical spine pain with radiculopathy (about 2 months)  Patient states that the pain began insidiously but notes that was a prior issue  Patient describes the pain as a dull ache/radiating which is worse with lifting, overhead function and improved with no particular positions or activities  She started on Prednisone for relief 2 weeks prior with some reduction in symptoms  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) and notes difficulty with work duties, ADL's and leisure functions as a result    Patients goals for PT are to decrease the pain and return to PLOF  Recurrent probem    Quality of life: good    Pain  Current pain ratin  At best pain ratin  At worst pain rating: 10  Quality: radiating (numbness)  Relieving factors: rest and relaxation  Aggravating factors: lifting and overhead activity  Progression: worsening    Social Support    Employment status: working  Hand dominance: right    Treatments  Previous treatment: medication  Current treatment: medication  Patient Goals  Patient goals for therapy: increased strength, independence with ADLs/IADLs, return to sport/leisure activities, increased motion and decreased pain          Objective     Concurrent Complaints  Positive for disturbed sleep  Negative for night pain    Postural Observations  Seated posture: poor  Standing posture: poor        Neurological Testing     Sensation   Cervical/Thoracic   Left   Intact: light touch    Right   Intact: light touch    Reflexes   Left   Biceps (C5/C6): brisk (3+)  Brachioradialis (C6): brisk (3+)    Right   Biceps (C5/C6): normal (2+)  Brachioradialis (C6): normal (2+)    Active Range of Motion   Cervical/Thoracic Spine       Cervical    Flexion:  WFL  Extension:  WFL  Left lateral flexion:  WFL  Right lateral flexion:  WFL  Left rotation: 74 degrees WFL  Right rotation: 70 degrees    WFL    Joint Play   Joints within functional limits: C1, C2, C3 and C4     Hypomobile: C5, C6, C7, T1, T2, T3 and T4   Mechanical Assessment    Cervical    Seated Protrusion: repeated movements   Pain location: no change  Seated retraction: repeated movements   Pain location: no change  Seated Flexion:  repeated movements  Pain location: no change  Seated Extension: repeated movements  Pain location: no change  Pain intensity: worse    Thoracic      Lumbar      Strength/Myotome Testing   Cervical Spine     Left   Normal strength    Right   Normal strength    Tests   Cervical   Negative vertical compression and cervical distraction  "    Left   Negative Spurling's Test A  Right   Positive Spurling's Test A  Right Shoulder   Negative ULTT1, ULTT3 and ULTT4  Lumbar   Negative vertical compression       Additional Tests Details  (+) empty can test  (+) Infraspinatus test  (+) Mackey  (+) painful arc    = (+) SAPS on the right            Precautions: N/A      Manuals 6/14            R posterior capsule JM on R             CTJ JM in supine             T-spine PA JM in prone                          Neuro Re-Ed                                                                                                        Ther Ex             UBE w/ postural corrections             Foam roll extensions 3-5'            Seated cervical retractions 2x10 x5\"            Scapular retraction w/ ER - GTB 2x10 x3\"            Sleeper stretch on R             TB posterior glide with ER             TB rows w/ cervical retraction                          Ther Activity                                       Gait Training                                       Modalities                                                         "

## 2023-06-19 ENCOUNTER — OFFICE VISIT (OUTPATIENT)
Dept: PHYSICAL THERAPY | Facility: CLINIC | Age: 68
End: 2023-06-19
Payer: COMMERCIAL

## 2023-06-19 DIAGNOSIS — M54.2 CERVICAL SPINE PAIN: Primary | ICD-10-CM

## 2023-06-19 DIAGNOSIS — M54.12 CERVICAL RADICULOPATHY, ACUTE: ICD-10-CM

## 2023-06-19 DIAGNOSIS — M25.811 IMPINGEMENT OF RIGHT SHOULDER: ICD-10-CM

## 2023-06-19 PROCEDURE — 97110 THERAPEUTIC EXERCISES: CPT

## 2023-06-19 PROCEDURE — 97140 MANUAL THERAPY 1/> REGIONS: CPT

## 2023-06-19 NOTE — PROGRESS NOTES
"Daily Note     Today's date: 2023  Patient name: Yovanny Dumont  : 1955  MRN: [id number]  Referring provider: Shari Belcher DO  Dx:   Encounter Diagnosis     ICD-10-CM    1  Cervical spine pain  M54 2       2  Cervical radiculopathy, acute  M54 12       3  Impingement of right shoulder  M25 811                      Subjective: Patient reported onset of L UE radicular symptoms over the weekend into fourth and fifth digit  Finds with certain shoulder movements she is limited by pain  Compliant with HEP  Objective: See treatment diary below  Assessment: Provided patient with cueing to reduce increased UT activation during postural strengthening due to compensations related to weakness  Decreased mobility through posterior shoulder and with thoracic mobility exercises  Plan: Continue per plan of care  Progress treatment as tolerated              Precautions: N/A    Manuals            R posterior capsule JM on R             CTJ JM in supine  DM           T-spine PA JM in prone  DM                        Neuro Re-Ed                                                                                                        Ther Ex             UBE w/ postural corrections  5 min           Foam roll extensions 3-5' 3 min           Seated cervical retractions 2x10 x5\" 5\" hold,  2x10           Scapular retraction w/ ER - GTB 2x10 x3\" 3\" hold,  2x10           Sleeper stretch on R  30\"x3           TB posterior glide with ER  OTB  x10           TB rows w/ cervical retraction  Pink  2x10                        Ther Activity                                       Gait Training                                       Modalities                                            "

## 2023-06-22 ENCOUNTER — OFFICE VISIT (OUTPATIENT)
Dept: PHYSICAL THERAPY | Facility: CLINIC | Age: 68
End: 2023-06-22
Payer: COMMERCIAL

## 2023-06-22 DIAGNOSIS — M54.2 CERVICAL SPINE PAIN: Primary | ICD-10-CM

## 2023-06-22 DIAGNOSIS — M25.811 IMPINGEMENT OF RIGHT SHOULDER: ICD-10-CM

## 2023-06-22 DIAGNOSIS — M54.12 CERVICAL RADICULOPATHY, ACUTE: ICD-10-CM

## 2023-06-22 PROCEDURE — 97110 THERAPEUTIC EXERCISES: CPT | Performed by: PHYSICAL THERAPIST

## 2023-06-22 PROCEDURE — 97140 MANUAL THERAPY 1/> REGIONS: CPT | Performed by: PHYSICAL THERAPIST

## 2023-06-22 NOTE — PROGRESS NOTES
"Daily Note     Today's date: 2023  Patient name: Mira Domingo  : 1955  MRN: [id number]  Referring provider: Ge Easley DO  Dx:   Encounter Diagnosis     ICD-10-CM    1  Cervical spine pain  M54 2       2  Cervical radiculopathy, acute  M54 12       3  Impingement of right shoulder  M25 811                      Subjective: Patient reports HEP compliance  Notes soreness with TE in the neck region  Occasional and intermittent numbness/tingling into the right shoulder and left 4th/5th digit  Patient notes that [facility] is ordering an MRI of her cervical spine  Objective: See treatment diary below      Assessment: Patient demonstrates no significant ulnar nerve tension on the left per ULTT and was educated to avoid ulnar tunnel compression in sitting/with desk work  Good tolerance to manuals without reproduction of pain  Updated HEP to include posterior capsule glide with TB  Plan: Continue per plan of care        Precautions: N/A    Manuals           R posterior capsule JM on R   G4          CTJ JM in supine  DM G4          T-spine PA JM in prone  DM G4                       Neuro Re-Ed                                                                                                        Ther Ex             UBE w/ postural corrections  5 min L1 2'/2'          Foam roll extensions 3-5' 3 min 10 x10\"          Seated cervical retractions 2x10 x5\" 5\" hold,  2x10 2x10 x5\"          Scapular retraction w/ ER - GTB 2x10 x3\" 3\" hold,  2x10 2x10 x3\"          Sleeper stretch on R  30\"x3 3x30\"          TB posterior glide with ER  OTB  x10 Blue 2x10          TB rows w/ cervical retraction  Pink  2x10                        Ther Activity                                       Gait Training                                       Modalities                                            "

## 2023-06-26 ENCOUNTER — OFFICE VISIT (OUTPATIENT)
Dept: PHYSICAL THERAPY | Facility: CLINIC | Age: 68
End: 2023-06-26
Payer: COMMERCIAL

## 2023-06-26 DIAGNOSIS — M54.2 CERVICAL SPINE PAIN: Primary | ICD-10-CM

## 2023-06-26 DIAGNOSIS — M54.12 CERVICAL RADICULOPATHY, ACUTE: ICD-10-CM

## 2023-06-26 DIAGNOSIS — M25.811 IMPINGEMENT OF RIGHT SHOULDER: ICD-10-CM

## 2023-06-26 PROCEDURE — 97140 MANUAL THERAPY 1/> REGIONS: CPT

## 2023-06-26 PROCEDURE — 97110 THERAPEUTIC EXERCISES: CPT

## 2023-06-26 NOTE — PROGRESS NOTES
"Daily Note     Today's date: 2023  Patient name: Anay Shaw  : 1955  MRN: [id number]  Referring provider: Leatha Ramirez DO  Dx:   Encounter Diagnosis     ICD-10-CM    1  Cervical spine pain  M54 2       2  Cervical radiculopathy, acute  M54 12       3  Impingement of right shoulder  M25 811                      Subjective: Patient reported less radicular symptoms, still pain in both shoulders that disrupts her sleep  Does not notice symptoms as much throughout the day  Scheduled for MRI of cervical spine this Thursday  Objective: See treatment diary below  Assessment: Tenderness remains through thoracic spinal segments  Still with DNF weakness per fatigue with progressions  Increased soreness post session  Continued to educate patient on importance of avoiding ulnar nerve compression with resting elbows on table  Plan: Continue per plan of care             Precautions: N/A    Manuals          R posterior capsule JM on R   G4          CTJ JM in supine  DM G4 AG         T-spine PA JM in prone  DM G4 AG                      Neuro Re-Ed                                                                                                        Ther Ex             UBE w/ postural corrections  5 min L1 2'/2' 2 min  ea  L1         Foam roll extensions 3-5' 3 min 10 x10\" 10\"x  10         Seated cervical retractions 2x10 x5\" 5\" hold,  2x10 2x10 x5\" OTB  3\" hold,  2x10         Scapular retraction w/ ER - GTB 2x10 x3\" 3\" hold,  2x10 2x10 x3\" 3\" hold,  2x10         Sleeper stretch on R  30\"x3 3x30\" 30\"x3         TB posterior glide with ER  OTB  x10 Blue 2x10 BTB  2x10         TB rows w/ cervical retraction  Pink  2x10                        Ther Activity                                       Gait Training                                       Modalities                                            "

## 2023-06-28 ENCOUNTER — OFFICE VISIT (OUTPATIENT)
Dept: PHYSICAL THERAPY | Facility: CLINIC | Age: 68
End: 2023-06-28
Payer: COMMERCIAL

## 2023-06-28 DIAGNOSIS — M54.12 CERVICAL RADICULOPATHY, ACUTE: ICD-10-CM

## 2023-06-28 DIAGNOSIS — M25.811 IMPINGEMENT OF RIGHT SHOULDER: ICD-10-CM

## 2023-06-28 DIAGNOSIS — M54.2 CERVICAL SPINE PAIN: Primary | ICD-10-CM

## 2023-06-28 PROCEDURE — 97140 MANUAL THERAPY 1/> REGIONS: CPT

## 2023-06-28 PROCEDURE — 97110 THERAPEUTIC EXERCISES: CPT

## 2023-06-28 NOTE — PROGRESS NOTES
"Daily Note     Today's date: 2023  Patient name: Anay Shaw  : 1955  MRN: [id number]  Referring provider: Leatha Ramirez DO  Dx:   Encounter Diagnosis     ICD-10-CM    1  Cervical spine pain  M54 2       2  Cervical radiculopathy, acute  M54 12       3  Impingement of right shoulder  M25 811                      Subjective: Patient reports difficulty sleeping at night due to pain  Objective: See treatment diary below  Assessment: Limited ROM with right shoulder flexion with positive impingement signs  Scheduled for MRI tomorrow and recheck with ortho physician on Friday  Will call to schedule further appointments following physician recheck  Plan: Continue per plan of care             Precautions: N/A    Manuals         R posterior capsule JM on R   G4  ksg        CTJ JM in supine  DM G4 AG ksg        T-spine PA JM in prone  DM G4 AG ksg                     Neuro Re-Ed                                                                                                        Ther Ex             UBE w/ postural corrections  5 min L1 2'/2' 2 min  ea  L1 2'/2'  L1        Foam roll extensions 3-5' 3 min 10 x10\" 10\"x  10 10\"x10        Seated cervical retractions 2x10 x5\" 5\" hold,  2x10 2x10 x5\" OTB  3\" hold,  2x10 OTB 3\" hold 2x10        Scapular retraction w/ ER - GTB 2x10 x3\" 3\" hold,  2x10 2x10 x3\" 3\" hold,  2x10 GTB  3\" 2x10        Sleeper stretch on R  30\"x3 3x30\" 30\"x3 30\"x3        TB posterior glide with ER  OTB  x10 Blue 2x10 BTB  2x10 BTB  2x10        TB rows w/ cervical retraction  Pink  2x10   BTB 2x10                     Ther Activity                                       Gait Training                                       Modalities                                            "

## (undated) DEVICE — IV FLUSH NSS 10ML POSIFLUSH

## (undated) DEVICE — BAG URINE DRAINAGE 2000ML ANTI RFLX LF

## (undated) DEVICE — ALLENTOWN DR  LUCENTE S LAP PK: Brand: CARDINAL HEALTH

## (undated) DEVICE — NEEDLE HYPO 22G X 1-1/2 IN

## (undated) DEVICE — TUBING SUCTION 5MM X 12 FT

## (undated) DEVICE — NEEDLE COUNTER LG W/RULER

## (undated) DEVICE — 3M™ STERI-DRAPE™ UNDER BUTTOCKS DRAPE WITH POUCH 1084: Brand: STERI-DRAPE™

## (undated) DEVICE — SCD SEQUENTIAL COMPRESSION COMFORT SLEEVE MEDIUM KNEE LENGTH: Brand: KENDALL SCD

## (undated) DEVICE — MEDI-VAC YANKAUER SUCTION HANDLE W/BULBOUS AND CONTROL VENT: Brand: CARDINAL HEALTH

## (undated) DEVICE — ADHESIVE SKIN HIGH VISCOSITY EXOFIN 1ML

## (undated) DEVICE — SUTURE CAPTURING DEVICE: Brand: CAPIO SLIM

## (undated) DEVICE — CAUTERY TIP POLISHER: Brand: DEVON

## (undated) DEVICE — PENCIL ELECTROSURG E-Z CLEAN -0035H

## (undated) DEVICE — DRAPE EQUIPMENT RF WAND

## (undated) DEVICE — CATH FOLEY 18FR 5ML 2 WAY UNCOATED SILICONE

## (undated) DEVICE — GLOVE PI ULTRA TOUCH SZ.7.5

## (undated) DEVICE — INTENDED FOR TISSUE SEPARATION, AND OTHER PROCEDURES THAT REQUIRE A SHARP SURGICAL BLADE TO PUNCTURE OR CUT.: Brand: BARD-PARKER SAFETY BLADES SIZE 10, STERILE

## (undated) DEVICE — NEEDLE 21 G X 1 1/2 SAFETY

## (undated) DEVICE — PACKING VAGINAL 2 IN

## (undated) DEVICE — INTENDED FOR TISSUE SEPARATION, AND OTHER PROCEDURES THAT REQUIRE A SHARP SURGICAL BLADE TO PUNCTURE OR CUT.: Brand: BARD-PARKER SAFETY BLADES SIZE 15, STERILE

## (undated) DEVICE — SPONGE STICK WITH PVP-I: Brand: KENDALL

## (undated) DEVICE — SUT VICRYL 2-0 SH 27 IN UNDYED J417H